# Patient Record
Sex: FEMALE | NOT HISPANIC OR LATINO | Employment: UNEMPLOYED | URBAN - METROPOLITAN AREA
[De-identification: names, ages, dates, MRNs, and addresses within clinical notes are randomized per-mention and may not be internally consistent; named-entity substitution may affect disease eponyms.]

---

## 2018-03-15 ENCOUNTER — TRANSCRIBE ORDERS (OUTPATIENT)
Dept: ADMINISTRATIVE | Facility: HOSPITAL | Age: 58
End: 2018-03-15

## 2018-03-15 DIAGNOSIS — R49.9 VOICE IMPAIRMENT: ICD-10-CM

## 2018-03-15 DIAGNOSIS — R22.1 NECK MASS: Primary | ICD-10-CM

## 2018-03-29 ENCOUNTER — HOSPITAL ENCOUNTER (OUTPATIENT)
Dept: CT IMAGING | Facility: HOSPITAL | Age: 58
Discharge: HOME | End: 2018-03-29
Payer: MEDICARE

## 2018-03-29 DIAGNOSIS — R22.1 NECK MASS: ICD-10-CM

## 2018-03-29 DIAGNOSIS — R49.9 VOICE IMPAIRMENT: ICD-10-CM

## 2018-03-29 PROCEDURE — 70491 CT SOFT TISSUE NECK W/DYE: CPT

## 2018-03-29 RX ADMIN — IOHEXOL 85 ML: 350 INJECTION, SOLUTION INTRAVENOUS at 11:28

## 2019-02-14 ENCOUNTER — TELEPHONE (OUTPATIENT)
Dept: GASTROENTEROLOGY | Facility: CLINIC | Age: 59
End: 2019-02-14

## 2019-02-14 NOTE — TELEPHONE ENCOUNTER
rich pt  Pt is calling for her mri results  She went to [address] [place] [address]  She believes it was in the beginning of January

## 2019-02-14 NOTE — TELEPHONE ENCOUNTER
Ghazala Fay [facility] medical records   only would go to voice mail    message to send all requests by fax to [phone]  Faxed request for MRI results for Pt

## 2019-08-19 ENCOUNTER — APPOINTMENT (EMERGENCY)
Dept: CT IMAGING | Facility: HOSPITAL | Age: 59
DRG: 571 | End: 2019-08-19
Payer: MEDICARE

## 2019-08-19 ENCOUNTER — ANESTHESIA EVENT (OUTPATIENT)
Dept: PERIOP | Facility: HOSPITAL | Age: 59
DRG: 571 | End: 2019-08-19
Payer: MEDICARE

## 2019-08-19 ENCOUNTER — HOSPITAL ENCOUNTER (INPATIENT)
Facility: HOSPITAL | Age: 59
LOS: 2 days | Discharge: HOME HEALTH | DRG: 571 | End: 2019-08-22
Attending: EMERGENCY MEDICINE | Admitting: SURGERY
Payer: MEDICARE

## 2019-08-19 DIAGNOSIS — E66.01 OBESITY, MORBID (MORE THAN 100 LBS OVER IDEAL WEIGHT OR BMI > 40) (HCC): ICD-10-CM

## 2019-08-19 DIAGNOSIS — L02.211 ABDOMINAL WALL ABSCESS: Primary | ICD-10-CM

## 2019-08-19 DIAGNOSIS — J45.909 ASTHMA, UNSPECIFIED ASTHMA SEVERITY, UNSPECIFIED WHETHER COMPLICATED, UNSPECIFIED WHETHER PERSISTENT: ICD-10-CM

## 2019-08-19 PROBLEM — F32.A DEPRESSION: Status: ACTIVE | Noted: 2019-08-19

## 2019-08-19 PROBLEM — R18.8 ABDOMINAL WALL FLUID COLLECTIONS: Status: ACTIVE | Noted: 2019-08-19

## 2019-08-19 PROBLEM — K43.2 INCISIONAL HERNIA, WITHOUT OBSTRUCTION OR GANGRENE: Status: ACTIVE | Noted: 2019-08-19

## 2019-08-19 PROBLEM — K21.9 GERD (GASTROESOPHAGEAL REFLUX DISEASE): Status: ACTIVE | Noted: 2019-08-19

## 2019-08-19 LAB
ALBUMIN SERPL BCP-MCNC: 2.8 G/DL (ref 3.5–5)
ALP SERPL-CCNC: 125 U/L (ref 46–116)
ALT SERPL W P-5'-P-CCNC: 26 U/L (ref 12–78)
ANION GAP SERPL CALCULATED.3IONS-SCNC: 10 MMOL/L (ref 4–13)
AST SERPL W P-5'-P-CCNC: 23 U/L (ref 5–45)
BACTERIA UR QL AUTO: ABNORMAL /HPF
BASOPHILS # BLD AUTO: 0.04 THOUSANDS/ΜL (ref 0–0.1)
BASOPHILS NFR BLD AUTO: 0 % (ref 0–1)
BILIRUB SERPL-MCNC: 0.4 MG/DL (ref 0.2–1)
BILIRUB UR QL STRIP: ABNORMAL
BUN SERPL-MCNC: 11 MG/DL (ref 5–25)
CALCIUM SERPL-MCNC: 9.5 MG/DL (ref 8.3–10.1)
CHLORIDE SERPL-SCNC: 103 MMOL/L (ref 100–108)
CLARITY UR: CLEAR
CO2 SERPL-SCNC: 25 MMOL/L (ref 21–32)
COLOR UR: YELLOW
CREAT SERPL-MCNC: 1.03 MG/DL (ref 0.6–1.3)
EOSINOPHIL # BLD AUTO: 0.09 THOUSAND/ΜL (ref 0–0.61)
EOSINOPHIL NFR BLD AUTO: 1 % (ref 0–6)
ERYTHROCYTE [DISTWIDTH] IN BLOOD BY AUTOMATED COUNT: 11.9 % (ref 11.6–15.1)
GFR SERPL CREATININE-BSD FRML MDRD: 60 ML/MIN/1.73SQ M
GLUCOSE SERPL-MCNC: 95 MG/DL (ref 65–140)
GLUCOSE UR STRIP-MCNC: NEGATIVE MG/DL
HCT VFR BLD AUTO: 42 % (ref 34.8–46.1)
HGB BLD-MCNC: 13.4 G/DL (ref 11.5–15.4)
HGB UR QL STRIP.AUTO: NEGATIVE
IMM GRANULOCYTES # BLD AUTO: 0.19 THOUSAND/UL (ref 0–0.2)
IMM GRANULOCYTES NFR BLD AUTO: 1 % (ref 0–2)
KETONES UR STRIP-MCNC: ABNORMAL MG/DL
LACTATE SERPL-SCNC: 0.7 MMOL/L (ref 0.5–2)
LEUKOCYTE ESTERASE UR QL STRIP: NEGATIVE
LIPASE SERPL-CCNC: 99 U/L (ref 73–393)
LYMPHOCYTES # BLD AUTO: 1.37 THOUSANDS/ΜL (ref 0.6–4.47)
LYMPHOCYTES NFR BLD AUTO: 8 % (ref 14–44)
MCH RBC QN AUTO: 30.9 PG (ref 26.8–34.3)
MCHC RBC AUTO-ENTMCNC: 31.9 G/DL (ref 31.4–37.4)
MCV RBC AUTO: 97 FL (ref 82–98)
MONOCYTES # BLD AUTO: 1.36 THOUSAND/ΜL (ref 0.17–1.22)
MONOCYTES NFR BLD AUTO: 8 % (ref 4–12)
NEUTROPHILS # BLD AUTO: 14.31 THOUSANDS/ΜL (ref 1.85–7.62)
NEUTS SEG NFR BLD AUTO: 82 % (ref 43–75)
NITRITE UR QL STRIP: NEGATIVE
NON-SQ EPI CELLS URNS QL MICRO: ABNORMAL /HPF
NRBC BLD AUTO-RTO: 0 /100 WBCS
PH UR STRIP.AUTO: 6 [PH]
PLATELET # BLD AUTO: 379 THOUSANDS/UL (ref 149–390)
PLATELET # BLD AUTO: 407 THOUSANDS/UL (ref 149–390)
PMV BLD AUTO: 9.3 FL (ref 8.9–12.7)
PMV BLD AUTO: 9.6 FL (ref 8.9–12.7)
POTASSIUM SERPL-SCNC: 4.4 MMOL/L (ref 3.5–5.3)
PROT SERPL-MCNC: 7.5 G/DL (ref 6.4–8.2)
PROT UR STRIP-MCNC: ABNORMAL MG/DL
RBC # BLD AUTO: 4.33 MILLION/UL (ref 3.81–5.12)
RBC #/AREA URNS AUTO: ABNORMAL /HPF
SODIUM SERPL-SCNC: 138 MMOL/L (ref 136–145)
SP GR UR STRIP.AUTO: 1.02 (ref 1–1.03)
UROBILINOGEN UR QL STRIP.AUTO: 0.2 E.U./DL
WBC # BLD AUTO: 17.36 THOUSAND/UL (ref 4.31–10.16)
WBC #/AREA URNS AUTO: ABNORMAL /HPF

## 2019-08-19 PROCEDURE — 87040 BLOOD CULTURE FOR BACTERIA: CPT | Performed by: EMERGENCY MEDICINE

## 2019-08-19 PROCEDURE — 83690 ASSAY OF LIPASE: CPT | Performed by: EMERGENCY MEDICINE

## 2019-08-19 PROCEDURE — 87075 CULTR BACTERIA EXCEPT BLOOD: CPT | Performed by: PHYSICIAN ASSISTANT

## 2019-08-19 PROCEDURE — 99222 1ST HOSP IP/OBS MODERATE 55: CPT | Performed by: INTERNAL MEDICINE

## 2019-08-19 PROCEDURE — 74176 CT ABD & PELVIS W/O CONTRAST: CPT

## 2019-08-19 PROCEDURE — 87077 CULTURE AEROBIC IDENTIFY: CPT | Performed by: PHYSICIAN ASSISTANT

## 2019-08-19 PROCEDURE — 81001 URINALYSIS AUTO W/SCOPE: CPT | Performed by: EMERGENCY MEDICINE

## 2019-08-19 PROCEDURE — 36415 COLL VENOUS BLD VENIPUNCTURE: CPT | Performed by: EMERGENCY MEDICINE

## 2019-08-19 PROCEDURE — 99221 1ST HOSP IP/OBS SF/LOW 40: CPT | Performed by: SURGERY

## 2019-08-19 PROCEDURE — 85025 COMPLETE CBC W/AUTO DIFF WBC: CPT | Performed by: EMERGENCY MEDICINE

## 2019-08-19 PROCEDURE — 99285 EMERGENCY DEPT VISIT HI MDM: CPT | Performed by: EMERGENCY MEDICINE

## 2019-08-19 PROCEDURE — 87185 SC STD ENZYME DETCJ PER NZM: CPT | Performed by: PHYSICIAN ASSISTANT

## 2019-08-19 PROCEDURE — 85049 AUTOMATED PLATELET COUNT: CPT | Performed by: PHYSICIAN ASSISTANT

## 2019-08-19 PROCEDURE — 96360 HYDRATION IV INFUSION INIT: CPT

## 2019-08-19 PROCEDURE — 87081 CULTURE SCREEN ONLY: CPT | Performed by: PHYSICIAN ASSISTANT

## 2019-08-19 PROCEDURE — 87070 CULTURE OTHR SPECIMN AEROBIC: CPT | Performed by: PHYSICIAN ASSISTANT

## 2019-08-19 PROCEDURE — 87181 SC STD AGAR DILUTION PER AGT: CPT | Performed by: PHYSICIAN ASSISTANT

## 2019-08-19 PROCEDURE — 87205 SMEAR GRAM STAIN: CPT | Performed by: PHYSICIAN ASSISTANT

## 2019-08-19 PROCEDURE — 99285 EMERGENCY DEPT VISIT HI MDM: CPT

## 2019-08-19 PROCEDURE — 96374 THER/PROPH/DIAG INJ IV PUSH: CPT

## 2019-08-19 PROCEDURE — 83605 ASSAY OF LACTIC ACID: CPT | Performed by: EMERGENCY MEDICINE

## 2019-08-19 PROCEDURE — 80053 COMPREHEN METABOLIC PANEL: CPT | Performed by: EMERGENCY MEDICINE

## 2019-08-19 RX ORDER — OMEPRAZOLE 40 MG/1
40 CAPSULE, DELAYED RELEASE ORAL DAILY
COMMUNITY

## 2019-08-19 RX ORDER — ZONISAMIDE 100 MG/1
100 CAPSULE ORAL DAILY
Status: DISCONTINUED | OUTPATIENT
Start: 2019-08-20 | End: 2019-08-19

## 2019-08-19 RX ORDER — TRAZODONE HYDROCHLORIDE 100 MG/1
150 TABLET ORAL
COMMUNITY

## 2019-08-19 RX ORDER — HEPARIN SODIUM 5000 [USP'U]/ML
5000 INJECTION, SOLUTION INTRAVENOUS; SUBCUTANEOUS EVERY 8 HOURS SCHEDULED
Status: DISCONTINUED | OUTPATIENT
Start: 2019-08-19 | End: 2019-08-22 | Stop reason: HOSPADM

## 2019-08-19 RX ORDER — BUSPIRONE HYDROCHLORIDE 10 MG/1
30 TABLET ORAL 2 TIMES DAILY
Status: DISCONTINUED | OUTPATIENT
Start: 2019-08-20 | End: 2019-08-22 | Stop reason: HOSPADM

## 2019-08-19 RX ORDER — MELOXICAM 15 MG/1
15 TABLET ORAL DAILY
COMMUNITY
End: 2021-07-07 | Stop reason: ALTCHOICE

## 2019-08-19 RX ORDER — ARIPIPRAZOLE 5 MG/1
15 TABLET ORAL DAILY
Status: DISCONTINUED | OUTPATIENT
Start: 2019-08-20 | End: 2019-08-22 | Stop reason: HOSPADM

## 2019-08-19 RX ORDER — MONTELUKAST SODIUM 10 MG/1
10 TABLET ORAL
COMMUNITY

## 2019-08-19 RX ORDER — METOCLOPRAMIDE 5 MG/1
5 TABLET ORAL
COMMUNITY

## 2019-08-19 RX ORDER — ZONISAMIDE 100 MG/1
100 CAPSULE ORAL DAILY
COMMUNITY

## 2019-08-19 RX ORDER — ONDANSETRON 2 MG/ML
4 INJECTION INTRAMUSCULAR; INTRAVENOUS EVERY 6 HOURS PRN
Status: DISCONTINUED | OUTPATIENT
Start: 2019-08-19 | End: 2019-08-22 | Stop reason: HOSPADM

## 2019-08-19 RX ORDER — PROPRANOLOL HYDROCHLORIDE 80 MG/1
CAPSULE, EXTENDED RELEASE ORAL
COMMUNITY
End: 2021-07-07 | Stop reason: ALTCHOICE

## 2019-08-19 RX ORDER — ARIPIPRAZOLE 15 MG/1
15 TABLET ORAL DAILY
COMMUNITY

## 2019-08-19 RX ORDER — BUSPIRONE HYDROCHLORIDE 10 MG/1
15 TABLET ORAL 3 TIMES DAILY
COMMUNITY

## 2019-08-19 RX ORDER — ALBUTEROL SULFATE 90 UG/1
2 AEROSOL, METERED RESPIRATORY (INHALATION) EVERY 6 HOURS PRN
COMMUNITY

## 2019-08-19 RX ORDER — DOCUSATE SODIUM 100 MG/1
100 CAPSULE, LIQUID FILLED ORAL 2 TIMES DAILY
Status: DISCONTINUED | OUTPATIENT
Start: 2019-08-19 | End: 2019-08-22 | Stop reason: HOSPADM

## 2019-08-19 RX ORDER — BUSPIRONE HYDROCHLORIDE 5 MG/1
15 TABLET ORAL 2 TIMES DAILY
Status: DISCONTINUED | OUTPATIENT
Start: 2019-08-19 | End: 2019-08-19

## 2019-08-19 RX ORDER — MONTELUKAST SODIUM 10 MG/1
10 TABLET ORAL
Status: DISCONTINUED | OUTPATIENT
Start: 2019-08-19 | End: 2019-08-22 | Stop reason: HOSPADM

## 2019-08-19 RX ORDER — PROPRANOLOL HCL 60 MG
60 CAPSULE, EXTENDED RELEASE 24HR ORAL DAILY
Status: DISCONTINUED | OUTPATIENT
Start: 2019-08-20 | End: 2019-08-22 | Stop reason: HOSPADM

## 2019-08-19 RX ORDER — TRAZODONE HYDROCHLORIDE 50 MG/1
50 TABLET ORAL
Status: DISCONTINUED | OUTPATIENT
Start: 2019-08-19 | End: 2019-08-22 | Stop reason: HOSPADM

## 2019-08-19 RX ORDER — ACETAMINOPHEN 325 MG/1
650 TABLET ORAL EVERY 6 HOURS PRN
Status: DISCONTINUED | OUTPATIENT
Start: 2019-08-19 | End: 2019-08-21

## 2019-08-19 RX ORDER — FAMOTIDINE 20 MG/1
20 TABLET, FILM COATED ORAL 2 TIMES DAILY
Status: DISCONTINUED | OUTPATIENT
Start: 2019-08-19 | End: 2019-08-22 | Stop reason: HOSPADM

## 2019-08-19 RX ORDER — SODIUM CHLORIDE, SODIUM LACTATE, POTASSIUM CHLORIDE, CALCIUM CHLORIDE 600; 310; 30; 20 MG/100ML; MG/100ML; MG/100ML; MG/100ML
100 INJECTION, SOLUTION INTRAVENOUS CONTINUOUS
Status: DISCONTINUED | OUTPATIENT
Start: 2019-08-20 | End: 2019-08-21

## 2019-08-19 RX ORDER — LEVALBUTEROL 1.25 MG/.5ML
1.25 SOLUTION, CONCENTRATE RESPIRATORY (INHALATION) EVERY 4 HOURS PRN
Status: DISCONTINUED | OUTPATIENT
Start: 2019-08-19 | End: 2019-08-21

## 2019-08-19 RX ORDER — HYDROCODONE BITARTRATE AND ACETAMINOPHEN 5; 325 MG/1; MG/1
1 TABLET ORAL EVERY 6 HOURS PRN
COMMUNITY
End: 2021-07-07 | Stop reason: ALTCHOICE

## 2019-08-19 RX ORDER — FLUTICASONE FUROATE AND VILANTEROL 200; 25 UG/1; UG/1
1 POWDER RESPIRATORY (INHALATION) DAILY
Status: DISCONTINUED | OUTPATIENT
Start: 2019-08-20 | End: 2019-08-22 | Stop reason: HOSPADM

## 2019-08-19 RX ORDER — BUPROPION HYDROCHLORIDE 300 MG/1
300 TABLET ORAL EVERY MORNING
COMMUNITY

## 2019-08-19 RX ORDER — BUPROPION HYDROCHLORIDE 150 MG/1
300 TABLET ORAL DAILY
Status: DISCONTINUED | OUTPATIENT
Start: 2019-08-20 | End: 2019-08-22 | Stop reason: HOSPADM

## 2019-08-19 RX ADMIN — MONTELUKAST 10 MG: 10 TABLET, FILM COATED ORAL at 22:52

## 2019-08-19 RX ADMIN — SODIUM CHLORIDE 1000 ML: 0.9 INJECTION, SOLUTION INTRAVENOUS at 12:23

## 2019-08-19 RX ADMIN — HEPARIN SODIUM 5000 UNITS: 5000 INJECTION INTRAVENOUS; SUBCUTANEOUS at 19:10

## 2019-08-19 RX ADMIN — BUSPIRONE HYDROCHLORIDE 15 MG: 5 TABLET ORAL at 19:12

## 2019-08-19 RX ADMIN — DOCUSATE SODIUM 100 MG: 100 CAPSULE, LIQUID FILLED ORAL at 19:09

## 2019-08-19 RX ADMIN — FAMOTIDINE 20 MG: 20 TABLET ORAL at 19:09

## 2019-08-19 RX ADMIN — TRAZODONE HYDROCHLORIDE 50 MG: 50 TABLET ORAL at 22:52

## 2019-08-19 RX ADMIN — PIPERACILLIN SODIUM AND TAZOBACTAM SODIUM 3.38 G: 36; 4.5 INJECTION, POWDER, FOR SOLUTION INTRAVENOUS at 22:53

## 2019-08-19 RX ADMIN — MORPHINE SULFATE 2 MG: 2 INJECTION, SOLUTION INTRAMUSCULAR; INTRAVENOUS at 19:11

## 2019-08-19 RX ADMIN — PIPERACILLIN SODIUM AND TAZOBACTAM SODIUM 3.38 G: 36; 4.5 INJECTION, POWDER, FOR SOLUTION INTRAVENOUS at 16:44

## 2019-08-19 NOTE — CONSULTS
Inpatient Medical Consultation - [facility] Internal Medicine    Patient Information: Meliza Smyth 63 y o  female MRN: [id number]  Unit/Bed#: -01 Encounter: [id number]  PCP: Tariq Gar MD  Date of Admission:  8/19/2019  Date of Consultation: 08/19/19  Requesting Physician: Devin Maurice MD    Reason For Consultation:   Medical management    Assessment/Plan:    Hospital Problem List:     Principal Problem:    Abdominal wall abscess  Active Problems: Morbid obesity (HCC)    Asthma    GERD (gastroesophageal reflux disease)    Incisional hernia, without obstruction or gangrene    Abdominal wall fluid collections    Depression      Plan:   Abdominal wall abscess  Assessment & Plan  Patient with abdominal wall and history of hernia surgery in the past removal of mesh/part of her intestine  She has quite a bit of drainage from her abdominal wall  Treatment as per primary team   She is also on antibiotics as per primary team     Depression  Assessment & Plan  Continue with her home medications    Abdominal wall fluid collections  Assessment & Plan  Treatment as per primary team    Incisional hernia, without obstruction or gangrene  Assessment & Plan  Treatment as per surgery  Plan as above    GERD (gastroesophageal reflux disease)  Assessment & Plan  Continue with home medications    Asthma  Assessment & Plan  Does not appear to be in any exacerbation  Would give p r n  Nebulizer treatments if needed    Morbid obesity ([address] )  Assessment & Plan  She needs to work on her diet and exercise      VTE Prophylaxis: Heparin  / sequential compression device     Recommendations for Discharge:  · Resume home medications on discharge    Counseling / Coordination of Care Time: 45+ minutes  Greater than 50% of total time spent on patient counseling and coordination of care  Collaboration of Care:  Were Recommendations Directly Discussed with Primary Treatment Team? - Yes     History of Present Illness:    Argelia Kamara Ruth Law is a 63 y o  female who is originally admitted to the surgical service on 8/19/2019 due to abdominal wall abscess  We are consulted for medical management  Per patient, she started having some pain in her abdominal wall few days ago  Then there was a blister which burst and started draining  She has history of abdominal hernia and has had previous mesh removed and at that time she also had part of her intestine removed  Denies any fever or chills  No nausea or vomiting  Has abdominal pain  Review of Systems          All systems are reviewed  Positive as per history of presenting illness  Patient answers no to all questions           Past Medical and Surgical History:     Past Medical History:   Diagnosis Date    Abdominal wall fluid collections 8/19/2019    Asthma 8/19/2019    Depression     GERD (gastroesophageal reflux disease) 8/19/2019    Incisional hernia, without obstruction or gangrene 8/19/2019       Past Surgical History:   Procedure Laterality Date    CHOLECYSTECTOMY      SMALL INTESTINE SURGERY      UMBILICAL HERNIA REPAIR         Meds/Allergies:  Medication Sig Dispensed Refills Start Date End Date Status   busPIRone (BUSPAR) 15 MG tablet   take 2 tablets by mouth twice a day   0 10/17/2017   Active   ADVAIR DISKUS 500-50 mcg/dose DISKUS   inhale 1 dose by mouth twice a day   1 11/24/2017   Active   HYDROcodone-acetaminophen (NORCO )  mg per tablet   take 1 tablet by mouth four times a day if needed   0 09/27/2017   Active   meloxicam (MOBIC) 15 MG tablet   take 1 tablet by mouth once daily after meals   0 12/08/2017   Active   montelukast (SINGULAIR) 10 mg tablet       0 11/25/2017   Active   omeprazole (PriLOSEC) 40 MG capsule       1 12/05/2017   Active   propranolol (INDERAL LA) 60 MG 24 hr capsule       0 12/08/2017   Active   zonisamide (ZONEGRAN) 100 MG capsule       0 12/07/2017   Active   diclofenac sodium 1 % gel   diclofenac 1 % topical gel   0     Active   albuterol (VENTOLIN HFA) 90 mcg/actuation inhaler   Ventolin HFA 90 mcg/actuation aerosol inhaler   0 08/05/2009   Active   dicyclomine (BENTYL) 20 mg tablet   dicyclomine 20 mg tablet   0     Active   hydrOXYzine (ATARAX) 50 MG tablet   hydroxyzine HCl 50 mg tablet   0     Active   methocarbamol (ROBAXIN) 750 MG tablet   Take by mouth    0 08/05/2009   Active   SUMAtriptan (IMITREX) 50 MG tablet   sumatriptan 50 mg tablet   0     Active   INCRUSE ELLIPTA 62 5 mcg/actuation powder for inhalation   1 INHALATION ONCE A DAY   11 06/27/2019   Active   ARIPiprazole (ABILIFY) 15 MG tablet   aripiprazole 15 mg tablet   0     Active   ARIPiprazole (ABILIFY) 15 MG tablet   TAKE 1 TABLET BY MOUTH EVERY DAY IN THE MORNING   2 07/11/2019   Active   buPROPion (WELLBUTRIN XL) 300 MG 24 hr tablet   bupropion HCl  mg 24 hr tablet, extended release   0     Active   busPIRone (BUSPAR) 10 MG tablet   buspirone 10 mg tablet   0     Active   busPIRone (BUSPAR) 10 MG tablet   TAKE 3 TABLETS BY MOUTH TWICE A DAY   2 07/11/2019   Active   traZODone (DESYREL) 100 MG tablet   Take by mouth  all medications and allergies reviewed    Allergies: Allergies   Allergen Reactions    Naproxen Myalgia, Other (See Comments) and Abdominal Pain     Pains of stomach flusnessof body, shortness of breath  Hot, cramps:Gi upset, Itchy and a burning sensation   Pains of stomach flusnessof body, shortness of breath      Risperidone Abdominal Pain, Other (See Comments) and GI Intolerance     Shortness of breath,nightmares,numbness of hands, blurred vision  Unsteady gait  Sharp pains in abdomen, Hot, itchy   Shortness of breath,nightmares,numbness of hands, blurred vision   Unsteady gait         Social History:     Marital Status: Unknown    Substance Use History:   Social History     Substance and Sexual Activity   Alcohol Use Never    Frequency: Never     Social History     Tobacco Use   Smoking Status Never Smoker Smokeless Tobacco Never Used     Social History     Substance and Sexual Activity   Drug Use Never       Family History:    Family history is reviewed and is not pertinent to her current illness            Physical Exam      Vitals:   Blood Pressure: 133/71 (08/19/19 1743)  Pulse: 71 (08/19/19 1743)  Temperature: 99 2 °F (37 3 °C) (08/19/19 1743)  Temp Source: Oral (08/19/19 1743)  Respirations: 19 (08/19/19 1743)  Height: 5' (152 4 cm) (08/19/19 1743)  Weight - Scale: 111 kg (244 lb 12 8 oz) (08/19/19 1759)  SpO2: 97 % (08/19/19 1743)      Vital signs are reviewed as above  Constitutional:  Morbidly obese female who is sitting at the edge of the bed  She basically has abdominal wall wound/abscess which has dressing on it and leaking and draining on the floor  Eyes: EOM grossly intact  Conjunctivae slightly pale  Patient has anicteric sclera  HENT: Oropharynx are crowded and moist  Did not notice any significant lesions on the tongue  Head normocephalic  Neck: Neck is obese and supple  I was not able to visualize any JVD at 90°  There is no significant lymphadenopathy  I also did not notice any significant thyromegaly  Cardiac: I did not hear any rubs or gallop  Patient appears to be in sinus rhythm  Respiratory: Patient not in significant respiratory distress  Air entry in general is poor because of body habitus  GI:  As above  Other examine is as per primary team regarding her wound  Neurologic:  Patient is awake and alert  Neurological examination is grossly intact  No obvious focal neurological deficit noticed  Skin:  As above  Psychiatric: Mood and affect are pleasant at this moment  No significant cyanosis or clubbing          Additional Data:     Lab Results:  I Have Reviewed All Lab Data Below:    Results from last 7 days   Lab Units 08/19/19  1215   WBC Thousand/uL 17 36*   HEMOGLOBIN g/dL 13 4   HEMATOCRIT % 42 0   PLATELETS Thousands/uL 407*   NEUTROS PCT % 82*   LYMPHS PCT % 8*   MONOS PCT % 8 EOS PCT % 1     Results from last 7 days   Lab Units 08/19/19  1215   POTASSIUM mmol/L 4 4   CHLORIDE mmol/L 103   CO2 mmol/L 25   BUN mg/dL 11   CREATININE mg/dL 1 03   CALCIUM mg/dL 9 5   ALK PHOS U/L 125*   ALT U/L 26   AST U/L 23           * Additional Pertinent Lab Tests Reviewed: All Labs Within Last 24 Hours Reviewed    Imaging: I have personally reviewed pertinent reports  Ct Abdomen Pelvis Wo Contrast    Result Date: 8/19/2019  Narrative: CT ABDOMEN AND PELVIS WITHOUT IV CONTRAST INDICATION:   Abdominal pain, unspecified abd pain and redness and blister by umbilicus  COMPARISON:  None  TECHNIQUE:  CT examination of the abdomen and pelvis was performed without intravenous contrast   Axial, sagittal, and coronal 2D reformatted images were created from the source data and submitted for interpretation  Radiation dose length product (DLP) for this visit:  [phone] mGy-cm   This examination, like all CT scans performed in the [facility], was performed utilizing techniques to minimize radiation dose exposure, including the use of iterative reconstruction and automated exposure control  Enteric contrast was not administered  Attempted intravenous contrast injection infiltrated approximately 20 cc of IV contrast, subsequent remainder of the exam was performed without contrast  FINDINGS: ABDOMEN LOWER CHEST:  No clinically significant abnormality identified in the visualized lower chest   Minimal pericardial fluid  32 LIVER/BILIARY TREE:  Unremarkable  GALLBLADDER:  Gallbladder is surgically absent  SPLEEN:  Unremarkable  PANCREAS:  Unremarkable  ADRENAL GLANDS:  Unremarkable  KIDNEYS/URETERS:  Parapelvic cysts noted, right greater than left  No acute abnormality evident  No hydronephrosis  STOMACH AND BOWEL:  Unremarkable  APPENDIX:  No findings to suggest appendicitis  ABDOMINOPELVIC CAVITY:  No ascites or free intraperitoneal air  No lymphadenopathy   VESSELS:  Uterus appears lobulated and bulky, limited evaluation on unenhanced exam, overall appearance suggest underlying leiomyomata which would be better evaluated with ultrasound  No adnexal mass evident  PELVIS REPRODUCTIVE ORGANS:  Unremarkable for patient's age  URINARY BLADDER:  Only minimally distended, excreted contrast present  No gross abnormality  ABDOMINAL WALL/INGUINAL REGIONS:  Just inferior to the umbilicus there is an midline ventral abdominal hernia defect measuring 4 0 x 2 4 cm, containing omental fat  Cephalad to this in just cephalad to the umbilicus is a large midline ventral soft tissue structure which is mixed in attenuation, cannot accurately differentiate solid versus cystic on this study, surrounding haziness within the fat suggesting component of inflammation  It measures 7 4 x 8 6 cm in size  Correlate with clinical findings for evolving abscess versus solid soft tissue finding such as hematoma or phlegmon this does not extend deep to the abdominal wall fascia  OSSEOUS STRUCTURES:  No acute fracture or destructive osseous lesion  Impression: Irregular lobulated soft tissue lesion with surrounding hazy infiltrate, seen midline supraumbilical   See above for further details  Infraumbilical fat-containing hernia  Lobulated bulky appearing uterus presumably related to leiomyomata  Workstation performed: [id number]         ** Please Note: Dragon 360 Dictation speech to text software may have been used in the creation of this document **      Thank you for letting us participating in this patient's care, we would follow along with you    Please do not hesitate to contact us with any concerns or questions to have

## 2019-08-19 NOTE — ED NOTES
1 CC                                                                Abdominal pain  2  Orientation status                                         Alert and oriented  3  Abnormal labs, tests, vitals                         WBC 17 6  HR 68, T 98, /96, R 20, 97% room air  4  Medication drips                                            Zosyn 3 375g  5  Time of last narcotics                                   None  6  IV lines, drains, tubes                                  #20 left ac, #22 left hand  7  Isolation status                                            none  8  Skin                                                             Abdominal redness and swelling with purulent drainage at umbilicus  9  Ambulation status                                        Ambulates with assistance  10   ED phone number                                    #[phone]        Jaden Castorena RN  08/19/19 2084

## 2019-08-19 NOTE — ED PROVIDER NOTES
History  Chief Complaint   Patient presents with    Abdominal Pain     pt c/o mid abdominal pain that started thursday  pt c/o diarrhea, denies N/V  History provided by:  Patient  Abdominal Pain   Pain location:  Periumbilical  Pain quality: aching and bloating    Pain radiates to:  Does not radiate  Pain severity:  Moderate  Onset quality:  Gradual  Duration:  5 days  Timing:  Constant  Progression:  Worsening  Chronicity:  New  Context: previous surgery (had numerous abd hernia repairs, last was more than 5 years ago though)    Relieved by:  Nothing  Worsened by:  Nothing  Ineffective treatments: took a laxative b/c she felt she "had a blockage" and then started with diarrhea and sx did not improve  Associated symptoms: chills, diarrhea (but only after laxative use) and fatigue    Associated symptoms: no dysuria, no fever, no hematuria, no nausea, no shortness of breath and no vomiting    Risk factors: multiple surgeries        None       Past Medical History:   Diagnosis Date    Depression        Past Surgical History:   Procedure Laterality Date    CHOLECYSTECTOMY      SMALL INTESTINE SURGERY      UMBILICAL HERNIA REPAIR         History reviewed  No pertinent family history  I have reviewed and agree with the history as documented  Social History     Tobacco Use    Smoking status: Never Smoker    Smokeless tobacco: Never Used   Substance Use Topics    Alcohol use: Never     Frequency: Never    Drug use: Never        Review of Systems   Constitutional: Positive for chills and fatigue  Negative for fever  Respiratory: Negative for shortness of breath  Gastrointestinal: Positive for abdominal pain and diarrhea (but only after laxative use)  Negative for nausea and vomiting  Genitourinary: Negative for dysuria and hematuria  All other systems reviewed and are negative  Physical Exam  Physical Exam   Constitutional: She is oriented to person, place, and time   She appears well-developed and well-nourished  No distress  HENT:   Head: Normocephalic and atraumatic  Nose: Nose normal    Mouth/Throat: Oropharynx is clear and moist    Eyes: Conjunctivae and EOM are normal    Neck: Normal range of motion  Neck supple  Cardiovascular: Normal rate, regular rhythm and normal heart sounds  Pulmonary/Chest: Effort normal and breath sounds normal  No respiratory distress  Abdominal: Soft  She exhibits no distension  There is tenderness  No hernia  Redness/induration and firmness of skin around umbilicus with superficial blister cephalad to the umbilicus   Musculoskeletal: Normal range of motion  She exhibits no edema  Neurological: She is alert and oriented to person, place, and time  Skin: Skin is warm and dry  She is not diaphoretic  Psychiatric: She has a normal mood and affect  Nursing note and vitals reviewed        Vital Signs  ED Triage Vitals   Temperature Pulse Respirations Blood Pressure SpO2   08/19/19 1202 08/19/19 1202 08/19/19 1202 08/19/19 1202 08/19/19 1202   98 °F (36 7 °C) 67 18 123/57 98 %      Temp Source Heart Rate Source Patient Position - Orthostatic VS BP Location FiO2 (%)   08/19/19 1147 08/19/19 1147 08/19/19 1147 08/19/19 1147 --   Oral Monitor Sitting Left arm       Pain Score       08/19/19 1202       Worst Possible Pain           Vitals:    08/19/19 1530 08/19/19 1545 08/19/19 1600 08/19/19 1615   BP: (!) 183/96      Pulse: 65 69 73 68   Patient Position - Orthostatic VS:             Visual Acuity      ED Medications  Medications   piperacillin-tazobactam (ZOSYN) 3 375 g in sodium chloride 0 9 % 50 mL IVPB (3 375 g Intravenous New Bag 8/19/19 1644)   sodium chloride 0 9 % bolus 1,000 mL (0 mL Intravenous Stopped 8/19/19 1338)       Diagnostic Studies  Results Reviewed     Procedure Component Value Units Date/Time    Lactic acid, plasma [[id number]]  (Normal) Collected:  08/19/19 1229    Lab Status:  Final result Specimen:  Blood from Arm, Right Updated:  08/19/19 1305     LACTIC ACID 0 7 mmol/L     Narrative:       Result may be elevated if tourniquet was used during collection  Urine Microscopic [[id number]]  (Abnormal) Collected:  08/19/19 1215    Lab Status:  Final result Specimen:  Urine, Clean Catch Updated:  08/19/19 1251     RBC, UA 0-1 /hpf      WBC, UA 0-1 /hpf      Epithelial Cells Occasional /hpf      Bacteria, UA Occasional /hpf     Comprehensive metabolic panel [[id number]]  (Abnormal) Collected:  08/19/19 1215    Lab Status:  Final result Specimen:  Blood from Arm, Right Updated:  08/19/19 1246     Sodium 138 mmol/L      Potassium 4 4 mmol/L      Chloride 103 mmol/L      CO2 25 mmol/L      ANION GAP 10 mmol/L      BUN 11 mg/dL      Creatinine 1 03 mg/dL      Glucose 95 mg/dL      Calcium 9 5 mg/dL      AST 23 U/L      ALT 26 U/L      Alkaline Phosphatase 125 U/L      Total Protein 7 5 g/dL      Albumin 2 8 g/dL      Total Bilirubin 0 40 mg/dL      eGFR 60 ml/min/1 73sq m     Narrative:       National Kidney Disease Foundation guidelines for Chronic Kidney Disease (CKD):     Stage 1 with normal or high GFR (GFR > 90 mL/min/1 73 square meters)    Stage 2 Mild CKD (GFR = 60-89 mL/min/1 73 square meters)    Stage 3A Moderate CKD (GFR = 45-59 mL/min/1 73 square meters)    Stage 3B Moderate CKD (GFR = 30-44 mL/min/1 73 square meters)    Stage 4 Severe CKD (GFR = 15-29 mL/min/1 73 square meters)    Stage 5 End Stage CKD (GFR <15 mL/min/1 73 square meters)  Note: GFR calculation is accurate only with a steady state creatinine    Lipase [[id number]]  (Normal) Collected:  08/19/19 1215    Lab Status:  Final result Specimen:  Blood from Arm, Right Updated:  08/19/19 1246     Lipase 99 u/L     Blood culture #1 [[id number]] Collected:  08/19/19 1229    Lab Status: In process Specimen:  Blood from Arm, Right Updated:  08/19/19 1238    Blood culture #2 [[id number]] Collected:  08/19/19 1229    Lab Status:   In process Specimen:  Blood from Arm, Left Updated:  08/19/19 1238    CBC and differential [[id number]]  (Abnormal) Collected:  08/19/19 1215    Lab Status:  Final result Specimen:  Blood from Arm, Right Updated:  08/19/19 1235     WBC 17 36 Thousand/uL      RBC 4 33 Million/uL      Hemoglobin 13 4 g/dL      Hematocrit 42 0 %      MCV 97 fL      MCH 30 9 pg      MCHC 31 9 g/dL      RDW 11 9 %      MPV 9 6 fL      Platelets 480 Thousands/uL      nRBC 0 /100 WBCs      Neutrophils Relative 82 %      Immat GRANS % 1 %      Lymphocytes Relative 8 %      Monocytes Relative 8 %      Eosinophils Relative 1 %      Basophils Relative 0 %      Neutrophils Absolute 14 31 Thousands/µL      Immature Grans Absolute 0 19 Thousand/uL      Lymphocytes Absolute 1 37 Thousands/µL      Monocytes Absolute 1 36 Thousand/µL      Eosinophils Absolute 0 09 Thousand/µL      Basophils Absolute 0 04 Thousands/µL     UA w Reflex to Microscopic [[id number]]  (Abnormal) Collected:  08/19/19 1215    Lab Status:  Final result Specimen:  Urine, Clean Catch Updated:  08/19/19 1230     Color, UA Yellow     Clarity, UA Clear     Specific Gravity, UA 1 025     pH, UA 6 0     Leukocytes, UA Negative     Nitrite, UA Negative     Protein, UA Trace mg/dl      Glucose, UA Negative mg/dl      Ketones, UA 15 (1+) mg/dl      Urobilinogen, UA 0 2 E U /dl      Bilirubin, UA Small     Blood, UA Negative                 CT abdomen pelvis wo contrast   Final Result by Lydia Elkins MD (08/19 2686)      Irregular lobulated soft tissue lesion with surrounding hazy infiltrate, seen midline supraumbilical   See above for further details  Infraumbilical fat-containing hernia  Lobulated bulky appearing uterus presumably related to leiomyomata  Workstation performed: [id number]                    Procedures  Procedures       ED Course  ED Course as of Aug 19 1648   Mon Aug 19, 2019   1541 Pt being seen by Surgery                                    MDM  Number of Diagnoses or Management Options  Abdominal wall abscess: new and requires workup     Amount and/or Complexity of Data Reviewed  Clinical lab tests: ordered and reviewed  Tests in the radiology section of CPT®: ordered and reviewed  Discuss the patient with other providers: yes        Disposition  Final diagnoses:   Abdominal wall abscess     Time reflects when diagnosis was documented in both MDM as applicable and the Disposition within this note     Time User Action Codes Description Comment    8/19/2019  4:44 PM Qian [address], [address] [facility] Abdominal wall abscess       ED Disposition     ED Disposition Condition Date/Time Comment    Admit Stable Mon Aug 19, 2019  4:44 PM Case was discussed with Jamel Barfield and the patient's admission status was agreed to be Admission Status: observation status to the service of Dr Jamel Barfield   Follow-up Information    None         Patient's Medications    No medications on file     No discharge procedures on file      ED Provider  Electronically Signed by           Yesy Landeros MD  08/19/19 7239

## 2019-08-19 NOTE — ASSESSMENT & PLAN NOTE
Patient with abdominal wall and history of hernia surgery in the past removal of mesh/part of her intestine  She has quite a bit of drainage from her abdominal wall    Treatment as per primary team

## 2019-08-19 NOTE — H&P
H&P Exam - General Surgery   Finn Fajardo 63 y o  female MRN: [id number]  Unit/Bed#: -01 Encounter: [id number]    Assessment/Plan   Ventral Incisional Hernia  Abdominal Wall fluid collection  Leukocytosis  Obesity    52-year-old he female with history of previous umbilical repair with mesh and revision with mesh removal presents with periumbilical abdominal pain with bullae caudal to her umbilicus  Area of erythema, tenderness, and induration consistent with cellulitis with diameter of approximately 15 cm  Bullae contain clear serous fluid with particulate but ruptured shortly after initial examination and produced a purulent foul-smelling discharge  Leukocytosis of 17  Patient is afebrile  CT scan showed irregular lobulated soft tissue fluid collection supraumbilical in the region consistent with patient's previous surgical scar  This measured 7 4 x 0 6 cm  Patient also with an from a umbilical hernia and a measuring 4 x 2 4 cm  Plan:  -- Admit under general surgical service and plan for I&D and wash out of abdominal wall abscess in the OR tomorrow  Cultures to be obtained at that time  -- IV antibiotics  -- NPO at midnight, and IVF  -- Warm compresses  -- Analgesia prn  -- Slim consult for medical management    History of Present Illness    CC:  I thought I had an obstruction  I took a laxative and then got diarrhea  Then it started to hurt around my belly button a few days ago  Yesterday my daughter looked and siad it was all red and it looked like I had a blister  HPI:  Finn Fajardo is a 63 y o  female with PMH of morbid obesity, asthma, and GERD, who presented with anterior periumbilical pain  Patient states about 2 weeks ago she thought she had an "obstruction", she decided take a laxative in about a week ago started to have diarrhea  A couple days ago she started to have periumbilical pain and burning    She states she had her daughter look at her abdomen and she was told that there is redness around her umbilical area with what seemed like a developing blister  She has experienced more pain prompting her to present to the emergency department  She states she has had less of an appetite over the last couple weeks  She states she is still having loose stools a couple times a day  She denies any hematochezia or melena  She denies any nausea or vomiting  She does feel more fatigued than usual   Patient states she had a laparoscopic gallbladder surgery a and later developed and umbilical hernia  This was repaired with mesh  In 2013 she had "bowel issues" and recurrence of the hernia  At that time she had small-bowel resection with removal of the mesh  Wound was left open to heal by secondary intention  She did have wound VAC therapy during this time  Patient states she has not had issue with the wound since then  She states she has had chills but denies any fevers  She has had 3 abdominal surgeries, which of those mentioned above:  Cholecystectomy, umbilical hernia repair, and umbilical hernia revision with small bowel resection  She believes her 1st hernia repair was at [facility]- when it was [place]  She states the 2nd surgery was in [facility] pass  Review of Systems   Constitutional: Positive for appetite change and chills  Negative for fever  HENT: Negative  Eyes: Negative  Respiratory: Negative  Negative for cough, shortness of breath and wheezing  Cardiovascular: Negative  Negative for chest pain and palpitations  Gastrointestinal: Positive for abdominal pain and diarrhea  Negative for blood in stool, nausea and vomiting  Skin: Positive for color change  Neurological: Negative          Historical Information   Past Medical History:   Diagnosis Date    Abdominal wall fluid collections 8/19/2019    Asthma 8/19/2019    Depression     GERD (gastroesophageal reflux disease) 8/19/2019    Incisional hernia, without obstruction or gangrene 8/19/2019     Past Surgical History:   Procedure Laterality Date    CHOLECYSTECTOMY      SMALL INTESTINE SURGERY      UMBILICAL HERNIA REPAIR       Social History   Social History     Substance and Sexual Activity   Alcohol Use Never    Frequency: Never     Social History     Substance and Sexual Activity   Drug Use Never     Social History     Tobacco Use   Smoking Status Never Smoker   Smokeless Tobacco Never Used     Family History: non-contributory    Meds/Allergies   PTA meds:   None     Allergies   Allergen Reactions    Naproxen Myalgia, Other (See Comments) and Abdominal Pain     Pains of stomach flusnessof body, shortness of breath  Hot, cramps:Gi upset, Itchy and a burning sensation   Pains of stomach flusnessof body, shortness of breath      Risperidone Abdominal Pain, Other (See Comments) and GI Intolerance     Shortness of breath,nightmares,numbness of hands, blurred vision  Unsteady gait  Sharp pains in abdomen, Hot, itchy   Shortness of breath,nightmares,numbness of hands, blurred vision   Unsteady gait         Objective   First Vitals:   Blood Pressure: 123/57 (08/19/19 1202)  Pulse: 67 (08/19/19 1202)  Temperature: 98 °F (36 7 °C) (08/19/19 1202)  Temp Source: Oral (08/19/19 1147)  Respirations: 18 (08/19/19 1202)  Height: 5' (152 4 cm) (08/19/19 1743)  Weight - Scale: 113 kg (248 lb 7 3 oz) (08/19/19 1213)  SpO2: 98 % (08/19/19 1202)    Current Vitals:   Blood Pressure: 133/71 (08/19/19 1743)  Pulse: 71 (08/19/19 1743)  Temperature: 99 2 °F (37 3 °C) (08/19/19 1743)  Temp Source: Oral (08/19/19 1743)  Respirations: 19 (08/19/19 1743)  Height: 5' (152 4 cm) (08/19/19 1743)  Weight - Scale: 111 kg (244 lb 12 8 oz) (08/19/19 1759)  SpO2: 97 % (08/19/19 1743)    No intake or output data in the 24 hours ending 08/19/19 1822    Invasive Devices     Peripheral Intravenous Line            Peripheral IV 08/19/19 Hand less than 1 day    Peripheral IV 08/19/19 Left Antecubital less than 1 day                Physical Exam Constitutional: She appears well-developed and well-nourished  No distress  Cardiovascular: Regular rhythm, S1 normal and S2 normal    Murmur heard  Systolic murmur is present with a grade of 2/6  Pulmonary/Chest: Effort normal and breath sounds normal  No accessory muscle usage  No respiratory distress  She has no decreased breath sounds  She has no wheezes  She has no rhonchi  Abdominal: Bowel sounds are normal  There is tenderness  A hernia (infraumbilical) is present  Neurological: She is alert  Lab Results:   CBC:   Lab Results   Component Value Date    WBC 17 36 (H) 08/19/2019    HGB 13 4 08/19/2019    HCT 42 0 08/19/2019    MCV 97 08/19/2019     (H) 08/19/2019    MCH 30 9 08/19/2019    MCHC 31 9 08/19/2019    RDW 11 9 08/19/2019    MPV 9 6 08/19/2019    NRBC 0 08/19/2019   , CMP:   Lab Results   Component Value Date    SODIUM 138 08/19/2019    K 4 4 08/19/2019     08/19/2019    CO2 25 08/19/2019    BUN 11 08/19/2019    CREATININE 1 03 08/19/2019    CALCIUM 9 5 08/19/2019    AST 23 08/19/2019    ALT 26 08/19/2019    ALKPHOS 125 (H) 08/19/2019    EGFR 60 08/19/2019   , Coagulation: No results found for: PT, INR, APTT, Urinalysis:   Lab Results   Component Value Date    COLORU Yellow 08/19/2019    CLARITYU Clear 08/19/2019    SPECGRAV 1 025 08/19/2019    PHUR 6 0 08/19/2019    LEUKOCYTESUR Negative 08/19/2019    NITRITE Negative 08/19/2019    GLUCOSEU Negative 08/19/2019    KETONESU 15 (1+) (A) 08/19/2019    BILIRUBINUR Small (A) 08/19/2019    BLOODU Negative 08/19/2019   , Lipase:   Lab Results   Component Value Date    LIPASE 99 08/19/2019     Imaging: I have personally reviewed pertinent reports  Ct Abdomen Pelvis Wo Contrast    Result Date: 8/19/2019  Impression: Irregular lobulated soft tissue lesion with surrounding hazy infiltrate, seen midline supraumbilical   See above for further details  Infraumbilical fat-containing hernia   Lobulated bulky appearing uterus presumably related to leiomyomata  Workstation performed: [id number]     EKG, Pathology, and Other Studies: I have personally reviewed pertinent reports        Code Status: Level 1 - Full Code    Jessica Pool PA-C  8/19/2019

## 2019-08-20 ENCOUNTER — ANESTHESIA (OUTPATIENT)
Dept: PERIOP | Facility: HOSPITAL | Age: 59
DRG: 571 | End: 2019-08-20
Payer: MEDICARE

## 2019-08-20 LAB
ANION GAP SERPL CALCULATED.3IONS-SCNC: 12 MMOL/L (ref 4–13)
BUN SERPL-MCNC: 9 MG/DL (ref 5–25)
CALCIUM SERPL-MCNC: 9.2 MG/DL (ref 8.3–10.1)
CHLORIDE SERPL-SCNC: 107 MMOL/L (ref 100–108)
CO2 SERPL-SCNC: 22 MMOL/L (ref 21–32)
CREAT SERPL-MCNC: 0.96 MG/DL (ref 0.6–1.3)
ERYTHROCYTE [DISTWIDTH] IN BLOOD BY AUTOMATED COUNT: 12 % (ref 11.6–15.1)
GFR SERPL CREATININE-BSD FRML MDRD: 65 ML/MIN/1.73SQ M
GLUCOSE P FAST SERPL-MCNC: 96 MG/DL (ref 65–99)
GLUCOSE SERPL-MCNC: 96 MG/DL (ref 65–140)
HCT VFR BLD AUTO: 38.3 % (ref 34.8–46.1)
HGB BLD-MCNC: 11.8 G/DL (ref 11.5–15.4)
MCH RBC QN AUTO: 30.5 PG (ref 26.8–34.3)
MCHC RBC AUTO-ENTMCNC: 30.8 G/DL (ref 31.4–37.4)
MCV RBC AUTO: 99 FL (ref 82–98)
PLATELET # BLD AUTO: 340 THOUSANDS/UL (ref 149–390)
PMV BLD AUTO: 9.6 FL (ref 8.9–12.7)
POTASSIUM SERPL-SCNC: 3.6 MMOL/L (ref 3.5–5.3)
RBC # BLD AUTO: 3.87 MILLION/UL (ref 3.81–5.12)
SODIUM SERPL-SCNC: 141 MMOL/L (ref 136–145)
WBC # BLD AUTO: 12.68 THOUSAND/UL (ref 4.31–10.16)

## 2019-08-20 PROCEDURE — 11042 DBRDMT SUBQ TIS 1ST 20SQCM/<: CPT | Performed by: PHYSICIAN ASSISTANT

## 2019-08-20 PROCEDURE — 10061 I&D ABSCESS COMP/MULTIPLE: CPT | Performed by: PHYSICIAN ASSISTANT

## 2019-08-20 PROCEDURE — 0JB80ZZ EXCISION OF ABDOMEN SUBCUTANEOUS TISSUE AND FASCIA, OPEN APPROACH: ICD-10-PCS | Performed by: SURGERY

## 2019-08-20 PROCEDURE — 87205 SMEAR GRAM STAIN: CPT | Performed by: SURGERY

## 2019-08-20 PROCEDURE — 11042 DBRDMT SUBQ TIS 1ST 20SQCM/<: CPT | Performed by: SURGERY

## 2019-08-20 PROCEDURE — 99222 1ST HOSP IP/OBS MODERATE 55: CPT | Performed by: NURSE PRACTITIONER

## 2019-08-20 PROCEDURE — 11045 DBRDMT SUBQ TISS EACH ADDL: CPT | Performed by: PHYSICIAN ASSISTANT

## 2019-08-20 PROCEDURE — 85027 COMPLETE CBC AUTOMATED: CPT | Performed by: PHYSICIAN ASSISTANT

## 2019-08-20 PROCEDURE — 87075 CULTR BACTERIA EXCEPT BLOOD: CPT | Performed by: SURGERY

## 2019-08-20 PROCEDURE — 11045 DBRDMT SUBQ TISS EACH ADDL: CPT | Performed by: SURGERY

## 2019-08-20 PROCEDURE — 80048 BASIC METABOLIC PNL TOTAL CA: CPT | Performed by: PHYSICIAN ASSISTANT

## 2019-08-20 PROCEDURE — 0J980ZZ DRAINAGE OF ABDOMEN SUBCUTANEOUS TISSUE AND FASCIA, OPEN APPROACH: ICD-10-PCS | Performed by: SURGERY

## 2019-08-20 PROCEDURE — 87185 SC STD ENZYME DETCJ PER NZM: CPT | Performed by: SURGERY

## 2019-08-20 PROCEDURE — 87176 TISSUE HOMOGENIZATION CULTR: CPT | Performed by: SURGERY

## 2019-08-20 PROCEDURE — 10061 I&D ABSCESS COMP/MULTIPLE: CPT | Performed by: SURGERY

## 2019-08-20 PROCEDURE — 87070 CULTURE OTHR SPECIMN AEROBIC: CPT | Performed by: SURGERY

## 2019-08-20 PROCEDURE — 87077 CULTURE AEROBIC IDENTIFY: CPT | Performed by: SURGERY

## 2019-08-20 RX ORDER — SUCCINYLCHOLINE/SOD CL,ISO/PF 100 MG/5ML
SYRINGE (ML) INTRAVENOUS AS NEEDED
Status: DISCONTINUED | OUTPATIENT
Start: 2019-08-20 | End: 2019-08-20 | Stop reason: SURG

## 2019-08-20 RX ORDER — OXYCODONE HYDROCHLORIDE AND ACETAMINOPHEN 5; 325 MG/1; MG/1
2 TABLET ORAL EVERY 6 HOURS PRN
Status: DISCONTINUED | OUTPATIENT
Start: 2019-08-20 | End: 2019-08-21

## 2019-08-20 RX ORDER — FENTANYL CITRATE 50 UG/ML
INJECTION, SOLUTION INTRAMUSCULAR; INTRAVENOUS AS NEEDED
Status: DISCONTINUED | OUTPATIENT
Start: 2019-08-20 | End: 2019-08-20 | Stop reason: SURG

## 2019-08-20 RX ORDER — LIDOCAINE HYDROCHLORIDE 20 MG/ML
INJECTION, SOLUTION INFILTRATION; PERINEURAL AS NEEDED
Status: DISCONTINUED | OUTPATIENT
Start: 2019-08-20 | End: 2019-08-20 | Stop reason: SURG

## 2019-08-20 RX ORDER — OXYCODONE HYDROCHLORIDE AND ACETAMINOPHEN 5; 325 MG/1; MG/1
1 TABLET ORAL EVERY 4 HOURS PRN
Status: DISCONTINUED | OUTPATIENT
Start: 2019-08-20 | End: 2019-08-21

## 2019-08-20 RX ORDER — ONDANSETRON 2 MG/ML
4 INJECTION INTRAMUSCULAR; INTRAVENOUS ONCE AS NEEDED
Status: DISCONTINUED | OUTPATIENT
Start: 2019-08-20 | End: 2019-08-20 | Stop reason: HOSPADM

## 2019-08-20 RX ORDER — ONDANSETRON 2 MG/ML
INJECTION INTRAMUSCULAR; INTRAVENOUS AS NEEDED
Status: DISCONTINUED | OUTPATIENT
Start: 2019-08-20 | End: 2019-08-20 | Stop reason: SURG

## 2019-08-20 RX ORDER — CLINDAMYCIN PHOSPHATE 900 MG/50ML
900 INJECTION INTRAVENOUS ONCE
Status: DISCONTINUED | OUTPATIENT
Start: 2019-08-20 | End: 2019-08-20 | Stop reason: HOSPADM

## 2019-08-20 RX ORDER — MIDAZOLAM HYDROCHLORIDE 1 MG/ML
INJECTION INTRAMUSCULAR; INTRAVENOUS AS NEEDED
Status: DISCONTINUED | OUTPATIENT
Start: 2019-08-20 | End: 2019-08-20 | Stop reason: SURG

## 2019-08-20 RX ORDER — FENTANYL CITRATE/PF 50 MCG/ML
25 SYRINGE (ML) INJECTION
Status: DISCONTINUED | OUTPATIENT
Start: 2019-08-20 | End: 2019-08-20 | Stop reason: HOSPADM

## 2019-08-20 RX ORDER — MAGNESIUM HYDROXIDE 1200 MG/15ML
LIQUID ORAL AS NEEDED
Status: DISCONTINUED | OUTPATIENT
Start: 2019-08-20 | End: 2019-08-20 | Stop reason: HOSPADM

## 2019-08-20 RX ORDER — ALBUTEROL SULFATE 2.5 MG/3ML
2.5 SOLUTION RESPIRATORY (INHALATION) ONCE AS NEEDED
Status: DISCONTINUED | OUTPATIENT
Start: 2019-08-20 | End: 2019-08-20 | Stop reason: HOSPADM

## 2019-08-20 RX ORDER — CLINDAMYCIN PHOSPHATE 900 MG/50ML
INJECTION INTRAVENOUS AS NEEDED
Status: DISCONTINUED | OUTPATIENT
Start: 2019-08-20 | End: 2019-08-20 | Stop reason: SURG

## 2019-08-20 RX ORDER — LEVALBUTEROL 1.25 MG/.5ML
1.25 SOLUTION, CONCENTRATE RESPIRATORY (INHALATION) ONCE
Status: COMPLETED | OUTPATIENT
Start: 2019-08-20 | End: 2019-08-20

## 2019-08-20 RX ORDER — PROPOFOL 10 MG/ML
INJECTION, EMULSION INTRAVENOUS AS NEEDED
Status: DISCONTINUED | OUTPATIENT
Start: 2019-08-20 | End: 2019-08-20 | Stop reason: SURG

## 2019-08-20 RX ORDER — DEXMEDETOMIDINE HYDROCHLORIDE 100 UG/ML
INJECTION, SOLUTION INTRAVENOUS AS NEEDED
Status: DISCONTINUED | OUTPATIENT
Start: 2019-08-20 | End: 2019-08-20 | Stop reason: SURG

## 2019-08-20 RX ADMIN — PROPOFOL 200 MG: 10 INJECTION, EMULSION INTRAVENOUS at 09:13

## 2019-08-20 RX ADMIN — MORPHINE SULFATE 2 MG: 2 INJECTION, SOLUTION INTRAMUSCULAR; INTRAVENOUS at 05:31

## 2019-08-20 RX ADMIN — FAMOTIDINE 20 MG: 20 TABLET ORAL at 18:05

## 2019-08-20 RX ADMIN — CLINDAMYCIN PHOSPHATE 900 MG: 18 INJECTION, SOLUTION INTRAMUSCULAR; INTRAVENOUS at 09:05

## 2019-08-20 RX ADMIN — FAMOTIDINE 20 MG: 20 TABLET ORAL at 09:00

## 2019-08-20 RX ADMIN — HEPARIN SODIUM 5000 UNITS: 5000 INJECTION INTRAVENOUS; SUBCUTANEOUS at 21:14

## 2019-08-20 RX ADMIN — OXYCODONE HYDROCHLORIDE AND ACETAMINOPHEN 1 TABLET: 5; 325 TABLET ORAL at 11:59

## 2019-08-20 RX ADMIN — LIDOCAINE HYDROCHLORIDE 5 ML: 20 INJECTION, SOLUTION INFILTRATION; PERINEURAL at 09:13

## 2019-08-20 RX ADMIN — DOCUSATE SODIUM 100 MG: 100 CAPSULE, LIQUID FILLED ORAL at 18:05

## 2019-08-20 RX ADMIN — PIPERACILLIN SODIUM AND TAZOBACTAM SODIUM 3.38 G: 36; 4.5 INJECTION, POWDER, FOR SOLUTION INTRAVENOUS at 11:52

## 2019-08-20 RX ADMIN — LEVALBUTEROL HYDROCHLORIDE 1.25 MG: 1.25 SOLUTION, CONCENTRATE RESPIRATORY (INHALATION) at 08:44

## 2019-08-20 RX ADMIN — Medication 200 MG: at 09:13

## 2019-08-20 RX ADMIN — SODIUM CHLORIDE, SODIUM LACTATE, POTASSIUM CHLORIDE, AND CALCIUM CHLORIDE 100 ML/HR: .6; .31; .03; .02 INJECTION, SOLUTION INTRAVENOUS at 00:15

## 2019-08-20 RX ADMIN — ARIPIPRAZOLE 15 MG: 5 TABLET ORAL at 09:00

## 2019-08-20 RX ADMIN — ONDANSETRON 4 MG: 2 INJECTION INTRAMUSCULAR; INTRAVENOUS at 09:48

## 2019-08-20 RX ADMIN — MORPHINE SULFATE 2 MG: 2 INJECTION, SOLUTION INTRAMUSCULAR; INTRAVENOUS at 21:14

## 2019-08-20 RX ADMIN — HEPARIN SODIUM 5000 UNITS: 5000 INJECTION INTRAVENOUS; SUBCUTANEOUS at 14:55

## 2019-08-20 RX ADMIN — PIPERACILLIN SODIUM AND TAZOBACTAM SODIUM 3.38 G: 36; 4.5 INJECTION, POWDER, FOR SOLUTION INTRAVENOUS at 18:06

## 2019-08-20 RX ADMIN — FENTANYL CITRATE 25 MCG: 50 INJECTION, SOLUTION INTRAMUSCULAR; INTRAVENOUS at 10:30

## 2019-08-20 RX ADMIN — TRAZODONE HYDROCHLORIDE 50 MG: 50 TABLET ORAL at 21:14

## 2019-08-20 RX ADMIN — BUSPIRONE HYDROCHLORIDE 30 MG: 10 TABLET ORAL at 18:05

## 2019-08-20 RX ADMIN — BUPROPION 300 MG: 150 TABLET, EXTENDED RELEASE ORAL at 09:00

## 2019-08-20 RX ADMIN — PHENYLEPHRINE HYDROCHLORIDE 100 MCG: 10 INJECTION INTRAVENOUS at 09:20

## 2019-08-20 RX ADMIN — DOCUSATE SODIUM 100 MG: 100 CAPSULE, LIQUID FILLED ORAL at 09:00

## 2019-08-20 RX ADMIN — HEPARIN SODIUM 5000 UNITS: 5000 INJECTION INTRAVENOUS; SUBCUTANEOUS at 05:31

## 2019-08-20 RX ADMIN — OXYCODONE HYDROCHLORIDE AND ACETAMINOPHEN 1 TABLET: 5; 325 TABLET ORAL at 19:47

## 2019-08-20 RX ADMIN — FENTANYL CITRATE 25 MCG: 50 INJECTION, SOLUTION INTRAMUSCULAR; INTRAVENOUS at 10:21

## 2019-08-20 RX ADMIN — DEXMEDETOMIDINE HCL 4 MCG: 100 INJECTION INTRAVENOUS at 09:13

## 2019-08-20 RX ADMIN — MIDAZOLAM HYDROCHLORIDE 2 MG: 1 INJECTION, SOLUTION INTRAMUSCULAR; INTRAVENOUS at 09:07

## 2019-08-20 RX ADMIN — PIPERACILLIN SODIUM AND TAZOBACTAM SODIUM 3.38 G: 36; 4.5 INJECTION, POWDER, FOR SOLUTION INTRAVENOUS at 05:31

## 2019-08-20 RX ADMIN — MONTELUKAST 10 MG: 10 TABLET, FILM COATED ORAL at 21:14

## 2019-08-20 RX ADMIN — FENTANYL CITRATE 100 MCG: 50 INJECTION, SOLUTION INTRAMUSCULAR; INTRAVENOUS at 09:13

## 2019-08-20 NOTE — PLAN OF CARE
Problem: Potential for Falls  Goal: Patient will remain free of falls  Description  INTERVENTIONS:  - Assess patient frequently for physical needs  -  Identify cognitive and physical deficits and behaviors that affect risk of falls    -  [place] fall precautions as indicated by assessment   - Educate patient/family on patient safety including physical limitations  - Instruct patient to call for assistance with activity based on assessment  - Modify environment to reduce risk of injury  - Consider OT/PT consult to assist with strengthening/mobility  Outcome: Progressing     Problem: Prexisting or High Potential for Compromised Skin Integrity  Goal: Skin integrity is maintained or improved  Description  INTERVENTIONS:  - Identify patients at risk for skin breakdown  - Assess and monitor skin integrity  - Assess and monitor nutrition and hydration status  - Monitor labs   - Assess for incontinence   - Turn and reposition patient  - Assist with mobility/ambulation  - Relieve pressure over bony prominences  - Avoid friction and shearing  - Provide appropriate hygiene as needed including keeping skin clean and dry  - Evaluate need for skin moisturizer/barrier cream  - Collaborate with interdisciplinary team   - Patient/family teaching  - Consider wound care consult   Outcome: Progressing     Problem: PAIN - ADULT  Goal: Verbalizes/displays adequate comfort level or baseline comfort level  Description  Interventions:  - Encourage patient to monitor pain and request assistance  - Assess pain using appropriate pain scale  - Administer analgesics based on type and severity of pain and evaluate response  - Implement non-pharmacological measures as appropriate and evaluate response  - Consider cultural and social influences on pain and pain management  - Notify physician/advanced practitioner if interventions unsuccessful or patient reports new pain  Outcome: Progressing     Problem: INFECTION - ADULT  Goal: Absence or prevention of progression during hospitalization  Description  INTERVENTIONS:  - Assess and monitor for signs and symptoms of infection  - Monitor lab/diagnostic results  - Monitor all insertion sites, i e  indwelling lines, tubes, and drains  - Monitor endotracheal if appropriate and nasal secretions for changes in amount and color  - [place] appropriate cooling/warming therapies per order  - Administer medications as ordered  - Instruct and encourage patient and family to use good hand hygiene technique  - Identify and instruct in appropriate isolation precautions for identified infection/condition  Outcome: Progressing  Goal: Absence of fever/infection during neutropenic period  Description  INTERVENTIONS:  - Monitor WBC    Outcome: Progressing     Problem: SAFETY ADULT  Goal: Patient will remain free of falls  Description  INTERVENTIONS:  - Assess patient frequently for physical needs  -  Identify cognitive and physical deficits and behaviors that affect risk of falls    -  [place] fall precautions as indicated by assessment   - Educate patient/family on patient safety including physical limitations  - Instruct patient to call for assistance with activity based on assessment  - Modify environment to reduce risk of injury  - Consider OT/PT consult to assist with strengthening/mobility  Outcome: Progressing  Goal: Maintain or return to baseline ADL function  Description  INTERVENTIONS:  -  Assess patient's ability to carry out ADLs; assess patient's baseline for ADL function and identify physical deficits which impact ability to perform ADLs (bathing, care of mouth/teeth, toileting, grooming, dressing, etc )  - Assess/evaluate cause of self-care deficits   - Assess range of motion  - Assess patient's mobility; develop plan if impaired  - Assess patient's need for assistive devices and provide as appropriate  - Encourage maximum independence but intervene and supervise when necessary  - Involve family in performance of ADLs  - Assess for home care needs following discharge   - Consider OT consult to assist with ADL evaluation and planning for discharge  - Provide patient education as appropriate  Outcome: Progressing  Goal: Maintain or return mobility status to optimal level  Description  INTERVENTIONS:  - Assess patient's baseline mobility status (ambulation, transfers, stairs, etc )    - Identify cognitive and physical deficits and behaviors that affect mobility  - Identify mobility aids required to assist with transfers and/or ambulation (gait belt, sit-to-stand, lift, walker, cane, etc )  - [place] fall precautions as indicated by assessment  - Record patient progress and toleration of activity level on Mobility SBAR; progress patient to next Phase/Stage  - Instruct patient to call for assistance with activity based on assessment  - Consider rehabilitation consult to assist with strengthening/weightbearing, etc   Outcome: Progressing     Problem: DISCHARGE PLANNING  Goal: Discharge to home or other facility with appropriate resources  Description  INTERVENTIONS:  - Identify barriers to discharge w/patient and caregiver  - Arrange for needed discharge resources and transportation as appropriate  - Identify discharge learning needs (meds, wound care, etc )  - Arrange for interpretive services to assist at discharge as needed  - Refer to Case Management Department for coordinating discharge planning if the patient needs post-hospital services based on physician/advanced practitioner order or complex needs related to functional status, cognitive ability, or social support system  Outcome: Progressing     Problem: Knowledge Deficit  Goal: Patient/family/caregiver demonstrates understanding of disease process, treatment plan, medications, and discharge instructions  Description  Complete learning assessment and assess knowledge base    Interventions:  - Provide teaching at level of understanding  - Provide teaching via preferred learning methods  Outcome: Progressing

## 2019-08-20 NOTE — OP NOTE
OPERATIVE REPORT  PATIENT NAME: Clemente Roberts    :  1960  MRN: [id number]  Pt Location: [facility] OR ROOM 02    SURGERY DATE: 2019    Surgeon(s) and Role:     * Lindsey Morales MD - Primary     * George Coleman PA-C - Assisting    Preop Diagnosis:  Abdominal wall abscess [L02 211]  Recurrent ventral incisional hernia    Post-Op Diagnosis Codes:     * Abdominal wall abscess [L02 211]  Recurrent ventral incisional hernia    Procedure(s) (LRB):  I&D and DEBRIDEMENT WOUND ABDOMINAL ([facility] OUT) (N/A)   (Incision and drainage of subcutaneous abscess cavity with excisional debridement of nonviable tissue within the cavity)    Specimen(s):  ID Type Source Tests Collected by Time Destination   A : abdominal wall abscess Tissue Wound ANAEROBIC CULTURE AND GRAM STAIN, CULTURE, TISSUE AND GRAM STAIN Lindsey Morales MD 2019 [id number]        Estimated Blood Loss:   Minimal    Drains:  none    Anesthesia Type:   General    Operative Indications:  Abdominal wall abscess [L02 211]      Operative Findings:  Large supraumbilical abdominal wall abscess cavity extending the depth of the subcutaneous space    Complications:   None    Procedure and Technique:  The patient was see in preop holding area and the procedure was discussed with her in detail and informed consent was obtained  She was  brought to the operating room and placed in supine position  She was intubated and sedated and abdomen was prepped and draped  A pre incision time-out was carried out with the entire operating room staff confirming the correct patient and procedure as well as preoperative antibiotics  There was an area of the cm superior to the umbilicus that was draining purulent material   In the midline the subcutaneous space was incised along the previous midline scar  This was 8 cm in length  We then entered a large abscess cavity and this was suctioned and irrigated   There was some nonviable fat debrided from the subcutaneous space sharply using the bovey electrocautery  A few blue PDS sutures were visible and were not cut or opened thus staying in the preperitoneal space  The final size of the wound measured 8 x 5 x 7 5 cm extending from the skin through the depth of the subcutaneous space  Betadine and saline was used to irrigated the wound and bleeding was controlled using the bovey  The wound was then packed with kerlex and covered with abd and paper tape  She tolerated the procedure well and was extubated and trasnported to [facility]  I was present for the entire procedure, A qualified resident physician was not available and A physician assistant was required during the procedure for retraction tissue handling,dissection and suturing          Patient Disposition:  PACU  and extubated and stable    SIGNATURE: Jarad Hodge MD  DATE: August 20, 2019  TIME: 9:50 AM

## 2019-08-20 NOTE — UTILIZATION REVIEW
Initial Clinical Review    Admission: Date/Time/Statement: OBS  8/19 1644 converted to IP on 8/20 1635 for treatment of abd wall abscess     Admitting Physician Enrrique Peraza    Level of Care Med Surg    Estimated length of stay More than 2 Midnights    Certification I certify that inpatient services are medically necessary for this patient for a duration of greater than two midnights  See H&P and MD Progress Notes for additional information about the patient's course of treatment  ED Arrival Information     Expected Arrival Acuity Means of Arrival Escorted By Service Admission Type    - 8/19/2019 11:41 Urgent Walk-In Self Surgery-General Urgent    Arrival Complaint    Abdominal pain        Chief Complaint   Patient presents with    Abdominal Pain     pt c/o mid abdominal pain that started thursday  pt c/o diarrhea, denies N/V  Assessment/Plan:  Grady Bennett is a 63 y o  female with PMH of morbid obesity, asthma, and GERD, who presented with anterior periumbilical pain  Patient states about 2 weeks ago she thought she had an "obstruction", she decided take a laxative in about a week ago started to have diarrhea  A couple days ago she started to have periumbilical pain and burning  She states she had her daughter look at her abdomen and she was told that there is redness around her umbilical area with what seemed like a developing blister  She has experienced more pain prompting her to present to the emergency department  She states she has had less of an appetite over the last couple weeks  She states she is still having loose stools a couple times a day  She denies any hematochezia or melena  She denies any nausea or vomiting  She does feel more fatigued than usual   Patient states she had a laparoscopic gallbladder surgery a and later developed and umbilical hernia  This was repaired with mesh  In 2013 she had "bowel issues" and recurrence of the hernia    At that time she had small-bowel resection with removal of the mesh  Wound was left open to heal by secondary intention  She did have wound VAC therapy during this time  Patient states she has not had issue with the wound since then  She states she has had chills but denies any fevers  She has had 3 abdominal surgeries, which of those mentioned above:  Cholecystectomy, umbilical hernia repair, and umbilical hernia revision with small bowel resection  She believes her 1st hernia repair was at [facility]- when it was [place]  She states the 2nd surgery was in [place]  Ventral Incisional Hernia  Abdominal Wall fluid collection  Leukocytosis  Obesity     52-year-old he female with history of previous umbilical repair with mesh and revision with mesh removal presents with periumbilical abdominal pain with bullae caudal to her umbilicus  Area of erythema, tenderness, and induration consistent with cellulitis with diameter of approximately 15 cm  Bullae contain clear serous fluid with particulate but ruptured shortly after initial examination and produced a purulent foul-smelling discharge  Leukocytosis of 17  Patient is afebrile  CT scan showed irregular lobulated soft tissue fluid collection supraumbilical in the region consistent with patient's previous surgical scar  This measured 7 4 x 0 6 cm  Patient also with an from a umbilical hernia and a measuring 4 x 2 4 cm     Plan:  -- Admit under general surgical service and plan for I&D and wash out of abdominal wall abscess in the OR tomorrow  Cultures to be obtained at that time  -- IV antibiotics  -- NPO at midnight, and IVF  -- Warm compresses  -- Analgesia prn  -- Slim consult for medical management    8/19 IM consult    Abdominal wall abscess  Assessment & Plan  Patient with abdominal wall and history of hernia surgery in the past removal of mesh/part of her intestine  She has quite a bit of drainage from her abdominal wall    Treatment as per primary team   She is also on antibiotics as per primary team    Depression  Assessment & Plan  Continue with her home medications   Abdominal wall fluid collections  Assessment & Plan  Treatment as per primary team    Incisional hernia, without obstruction or gangrene  Assessment & Plan  Treatment as per surgery  Plan as above   GERD (gastroesophageal reflux disease)  Assessment & Plan  Continue with home medications   Asthma  Assessment & Plan  Does not appear to be in any exacerbation  Would give p r n   Nebulizer treatments if needed   Morbid obesity ([address] )  Assessment & Plan  She needs to work on her diet and exercise    OP note 8/20    I&D and DEBRIDEMENT WOUND ABDOMINAL ([address] OUT) (N/   Operative Findings:  Large supraumbilical abdominal wall abscess cavity extending the depth of the subcutaneous space     ED Triage Vitals   Temperature Pulse Respirations Blood Pressure SpO2   08/19/19 1202 08/19/19 1202 08/19/19 1202 08/19/19 1202 08/19/19 1202   98 °F (36 7 °C) 67 18 123/57 98 %      Temp Source Heart Rate Source Patient Position - Orthostatic VS BP Location FiO2 (%)   08/19/19 1147 08/19/19 1147 08/19/19 1147 08/19/19 1147 --   Oral Monitor Sitting Left arm       Pain Score       08/19/19 1202       Worst Possible Pain        Wt Readings from Last 1 Encounters:   08/19/19 111 kg (244 lb 12 8 oz)     Additional Vital Signs:   08/20/19 0827  98 3 °F (36 8 °C)  63  18  133/62    99 %  None (Room air)     08/20/19 0744  98 4 °F (36 9 °C)  65  20  123/63    98 %  None (Room air)  Lying   08/20/19 0015              None (Room air)     08/19/19 2300  98 4 °F (36 9 °C)  63  18  145/63    98 %  None (Room air)  Lying   08/19/19 1743  99 2 °F (37 3 °C)  71  19  133/71  93  97 %  None (Room air)  Lying   08/19/19 1716    70  20  147/66    99 %       08/19/19 1715    69  18  147/66    99 %       08/19/19 1615    68        97 %       08/19/19 1600    73        98 %       08/19/19 1545    69        100 %     08/19/19 1530    65  20  183/96Abnormal     99 %       08/19/19 1515    66        98 %       08/19/19 1500    63  18  186/75Abnormal     98 %       08/19/19 1445    65  20  162/72    98 %       08/19/19 1430    67  19  162/72    97 %       08/19/19 1400    63  18  159/73    97 %       08/19/19 1345    65  20  156/71    97 %       08/19/19 1245    62  20  198/74Abnormal     98 %       08/19/19 1202  98 °F (36 7 °C)  67  18  123/57    98 %  None (Room air)         Pertinent Labs/Diagnostic Test Results:   8/19 Ct abd -Irregular lobulated soft tissue lesion with surrounding hazy infiltrate, seen midline supraumbilical   See above for further details    Infraumbilical fat-containing hernia    Lobulated bulky appearing uterus presumably related to leiomyomata       Results from last 7 days   Lab Units 08/20/19  0555 08/19/19  2048 08/19/19  1215   WBC Thousand/uL 12 68*  --  17 36*   HEMOGLOBIN g/dL 11 8  --  13 4   HEMATOCRIT % 38 3  --  42 0   PLATELETS Thousands/uL 340 379 407*   NEUTROS ABS Thousands/µL  --   --  14 31*     Results from last 7 days   Lab Units 08/20/19  0555 08/19/19  1215   SODIUM mmol/L 141 138   POTASSIUM mmol/L 3 6 4 4   CHLORIDE mmol/L 107 103   CO2 mmol/L 22 25   ANION GAP mmol/L 12 10   BUN mg/dL 9 11   CREATININE mg/dL 0 96 1 03   EGFR ml/min/1 73sq m 65 60   CALCIUM mg/dL 9 2 9 5     Results from last 7 days   Lab Units 08/19/19  1215   AST U/L 23   ALT U/L 26   ALK PHOS U/L 125*   TOTAL PROTEIN g/dL 7 5   ALBUMIN g/dL 2 8*   TOTAL BILIRUBIN mg/dL 0 40     Results from last 7 days   Lab Units 08/20/19  0555 08/19/19  1215   GLUCOSE RANDOM mg/dL 96 95     Results from last 7 days   Lab Units 08/19/19  1229   LACTIC ACID mmol/L 0 7     Results from last 7 days   Lab Units 08/19/19  1215   LIPASE u/L 99     Results from last 7 days   Lab Units 08/19/19  1215   CLARITY UA  Clear   COLOR UA  Yellow   SPEC GRAV UA  1 025   PH UA  6 0   GLUCOSE UA mg/dl Negative KETONES UA mg/dl 15 (1+)*   BLOOD UA  Negative   PROTEIN UA mg/dl Trace*   NITRITE UA  Negative   BILIRUBIN UA  Small*   UROBILINOGEN UA E U /dl 0 2   LEUKOCYTES UA  Negative   WBC UA /hpf 0-1*   RBC UA /hpf 0-1*   BACTERIA UA /hpf Occasional   EPITHELIAL CELLS WET PREP /hpf Occasional       ED Treatment:   Medication Administration from 08/19/2019 1141 to 08/19/2019 1743       Date/Time Order Dose Route Action     08/19/2019 1223 sodium chloride 0 9 % bolus 1,000 mL 1,000 mL Intravenous New Bag     08/19/2019 1644 piperacillin-tazobactam (ZOSYN) 3 375 g in sodium chloride 0 9 % 50 mL IVPB 3 375 g Intravenous New Bag        Past Medical History:   Diagnosis Date    Abdominal wall fluid collections 8/19/2019    Asthma 8/19/2019    Depression     GERD (gastroesophageal reflux disease) 8/19/2019    Incisional hernia, without obstruction or gangrene 8/19/2019     Present on Admission:   Asthma   GERD (gastroesophageal reflux disease)   Incisional hernia, without obstruction or gangrene   Abdominal wall fluid collections   Abdominal wall abscess   Morbid obesity ([address] )   Depression      Admitting Diagnosis: Abdominal pain [R10 9]  Abdominal wall abscess [L02 211]  Obesity, morbid (more than 100 lbs over ideal weight or BMI > 40) ([facility]) [E66 01]  Asthma, unspecified asthma severity, unspecified whether complicated, unspecified whether persistent [J45 909]  Age/Sex: 63 y o  female  Admission Orders:    Current Facility-Administered Medications:  [MAR Hold] acetaminophen 650 mg Oral Q6H PRN     [MAR Hold] ARIPiprazole 15 mg Oral Daily     [MAR Hold] buPROPion 300 mg Oral Daily     [MAR Hold] busPIRone 30 mg Oral BID     [MAR Hold] docusate sodium 100 mg Oral BID     [MAR Hold] famotidine 20 mg Oral BID     [MAR Hold] fluticasone-vilanterol 1 puff Inhalation Daily     [MAR Hold] heparin (porcine) 5,000 Units Subcutaneous Q8H [address]     lactated ringers 100 mL/hr Intravenous Continuous     [MAR Hold] levalbuterol 1 25 mg Nebulization Q4H PRN     [MAR Hold] montelukast 10 mg Oral HS     morphine injection 2 mg Intravenous Q3H PRN x2    [MAR Hold] ondansetron 4 mg Intravenous Q6H PRN     piperacillin-tazobactam 3 375 g Intravenous Q6H     [MAR Hold] propranolol 60 mg Oral Daily     [MAR Hold] traZODone 50 mg Oral HS       IS   I&O   SCD  Reg diet to NPO   Up and OOB as melba   IP CONSULT TO [address] [place] [address] Utilization Review Department  Phone: [phone]; Fax [phone]  [email or web address]  ATTENTION: Please call with any questions or concerns to [phone]  and carefully listen to the prompts so that you are directed to the right person  Send all requests for admission clinical reviews, approved or denied determinations and any other requests to fax [phone]   All voicemails are confidential

## 2019-08-20 NOTE — ANESTHESIA PREPROCEDURE EVALUATION
Review of Systems/Medical History  Patient summary reviewed  Chart reviewed  History of anesthetic complications (urinary retention after GA)     Cardiovascular  Exercise tolerance (METS): <4, Exercise comment: Functional status limited by knee pain    Pulmonary  Not a smoker , Asthma , well controlled/ stable ,        GI/Hepatic    GERD poorly controlled,   Comment: Hx of umbilical hernia repair with mesh and revision with mesh, presents with abscess/surrounding cellulitis     Negative  ROS        Endo/Other    Obesity  morbid obesity   GYN  Negative gynecology ROS          Hematology      Comment: Abraham Kugel witness, refuses all blood products Musculoskeletal    Arthritis     Neurology  Negative neurology ROS      Psychology   Depression ,              Physical Exam    Airway    Mallampati score: II  TM Distance: >3 FB  Neck ROM: full     Dental       Cardiovascular  Rhythm: regular, Rate: normal, Murmur,     Pulmonary  No wheezes,     Other Findings        Anesthesia Plan  ASA Score- 3     Anesthesia Type- general with ASA Monitors  Additional Monitors:   Airway Plan: ETT  Comment: Pre-procedure xopenex nebulizer  Refuses all blood products, including albumin  Would accept cell saver if necessary        Plan Factors-    Induction- intravenous  Postoperative Plan- Plan for postoperative opioid use  Planned trial extubation    Informed Consent- Anesthetic plan and risks discussed with patient  I personally reviewed this patient with the CRNA  Discussed and agreed on the Anesthesia Plan with the CRNA             Lab Results   Component Value Date    WBC 12 68 (H) 08/20/2019    HGB 11 8 08/20/2019    HCT 38 3 08/20/2019    MCV 99 (H) 08/20/2019     08/20/2019     Lab Results   Component Value Date    SODIUM 141 08/20/2019    K 3 6 08/20/2019     08/20/2019    CO2 22 08/20/2019    BUN 9 08/20/2019    CREATININE 0 96 08/20/2019    GLUC 96 08/20/2019    CALCIUM 9 2 08/20/2019     Lab Results Component Value Date    ALT 26 08/19/2019    AST 23 08/19/2019    ALKPHOS 125 (H) 08/19/2019     No results found for: INR, PROTIME  No results found for: HGBA1C

## 2019-08-20 NOTE — SOCIAL WORK
CM met with pt at bedside  OBS status reviewed and signed by pt  Copy to pt and copy to MR for scanning  Pt lives with her 2 daughters Jhony Rebolledo and Anderson Corcoran in a ranch style house with 3 ANGIE  Pt is able to navigate steps and is independent with ADL's  She uses no DME's  She has used OP/PT in the past and also used [facility] services with GERALDO Plascencia  She does have anxiety and depression that is treated with medication by a psychiatrist   She cannot remember his name at the present time  She has an appointment with her PCP Dr Dilip Del Toro for this Thursday, 8/22/19  Denies substance abuse or tobacco abuse  Pt cannot have blood products due to her Buddhism beliefs  Pt uses [facility] and has no problem with co-pays  She has an Advanced Directive and her POA is her daughter Jhony Rebolledo  She does not work or drive  Her daughter Anderson Corcoran will transport home when she is medically cleared  CM will place referral to GERALDO Placsencia for [facility] services on discharge  Pt understands that she has freedom of choice  CM will follow through hospitalization  CM reviewed discharge planning process including the following: identifying help at home, patient preference for discharge planning needs, pharmacy preference, and availability of treatment team to discuss questions or concerns patient and/or family may have regarding understanding medications and recognizing signs and symptoms once discharged  CM also encouraged patient to follow up with all recommended appointments after discharge  Patient advised of importance for patient and family to participate in managing patients medical well being  CM name and role reviewed  Discharge Checklist reviewed and CM will continue to monitor for progress toward discharge goals in nursing and provider rounds

## 2019-08-20 NOTE — PERIOPERATIVE NURSING NOTE
Patient received in PACU from OR  Patient is currently awake, oriented  Following medication administration, states surgical pain is "much better" and rates as documented in flowsheets  Anticipating return to inpatient room  Patient placed on contact isolation status pending results of cultures taken in OR

## 2019-08-20 NOTE — MALNUTRITION/BMI
This medical record reflects one or more clinical indicators suggestive of malnutrition and/or morbid obesity  BMI Findings:  BMI Classifications: Morbid Obesity 45-49 9   Energy intake > energy output over time and lack of physical activity  Body mass index is 47 65 kg/m²  See Nutrition note dated 08/20/2019 for additional details  Completed nutrition assessment is viewable in the nutrition documentation

## 2019-08-20 NOTE — NURSING NOTE
Patient received from PACU, awake and alert  Transferred from stretcher to bed, noted small amount of serosanguinous fluid leaking from around bandage  Patted dry and watched  Leaking resolved  Pt lying comfortably in bed

## 2019-08-20 NOTE — PROGRESS NOTES
patient's extravasation evaluated bedside and she has no compromise to her skin or signs/symptoms of compartment syndrome  She still has slight swelling and she was instructed to continue to elevate the arm until swelling completely resolves  Patient has no further concerns regarding extravasation site

## 2019-08-20 NOTE — CONSULTS
[facility]  Consult Note - [facility] 1960, 63 y o  female MRN: [id number]    Unit/Bed#: -01 Encounter: [id number]    Primary Care Provider: Irvin Barry MD   Date and time admitted to hospital: 8/19/2019 11:46 AM      Depression  Assessment & Plan  Continue with her home medications    Abdominal wall fluid collections  Assessment & Plan  Treatment as per primary team  Postop day 0 of I and D of abdominal wall abscess    Incisional hernia, without obstruction or gangrene  Assessment & Plan  Treatment as per surgery  Plan as above    GERD (gastroesophageal reflux disease)  Assessment & Plan  Continue with home medications    Asthma  Assessment & Plan  Does not appear to be in any exacerbation  Would give p r n  Nebulizer treatments if needed    Morbid obesity ([address] )  Assessment & Plan  She needs to work on her diet and exercise    * Abdominal wall abscess  Assessment & Plan  Patient with abdominal wall and history of hernia surgery in the past removal of mesh/part of her intestine  She has quite a bit of drainage from her abdominal wall  Treatment as per primary team        Primary Service: Crystal Borjas MD  Reason for Consultation:  Medical management    Education and Discussions with Family / Patient:  Educated patient current plan of care denies any additional questions or concerns at this time  Was care plan discussed with the Primary service today?: Yes  Is patient acceptable for discharge from medicine standpoint?: Yes  Discharge Recommendations:  Resume home medication    Time Spent for Care: 20 minutes  More than 50% of total time spent on counseling and coordination of care as described above      VTE Pharmacologic Prophylaxis: Heparin / sequential compression device    Subjective:   Reports that her pain is controlled denies any chest pain chest tightness shortness of breath or difficulty breathing is agreeable to VNA, tolerating meals, ambulating without difficulty    Objective:     Vitals:   Temp (24hrs), Av 2 °F (36 8 °C), Min:97 1 °F (36 2 °C), Max:99 2 °F (37 3 °C)    Temp:  [97 1 °F (36 2 °C)-99 2 °F (37 3 °C)] 98 4 °F (36 9 °C)  HR:  [57-73] 59  Resp:  [12-28] 16  BP: (104-186)/(55-96) 121/58  SpO2:  [95 %-100 %] 98 %  Body mass index is 47 65 kg/m²  Input and Output Summary (last 24 hours): Intake/Output Summary (Last 24 hours) at 2019 1427  Last data filed at 2019 1000  Gross per 24 hour   Intake 600 ml   Output 1275 ml   Net -675 ml       Physical Exam:     Physical Exam   Constitutional: She is oriented to person, place, and time  She appears well-developed and well-nourished  HENT:   Head: Normocephalic  Eyes: Pupils are equal, round, and reactive to light  Neck: Normal range of motion  Neck supple  Cardiovascular: Normal rate  Pulmonary/Chest: Effort normal    Abdominal: Soft  Bowel sounds are normal    Musculoskeletal: Normal range of motion  Neurological: She is alert and oriented to person, place, and time  Skin: Skin is warm and dry  Psychiatric: She has a normal mood and affect  Her behavior is normal  Thought content normal    Nursing note and vitals reviewed  Additional Data:     Results from last 7 days   Lab Units 19  0555  19  1215   WBC Thousand/uL 12 68*  --  17 36*   HEMOGLOBIN g/dL 11 8  --  13 4   HEMATOCRIT % 38 3  --  42 0   PLATELETS Thousands/uL 340   < > 407*   NEUTROS PCT %  --   --  82*   LYMPHS PCT %  --   --  8*   MONOS PCT %  --   --  8   EOS PCT %  --   --  1    < > = values in this interval not displayed  Results from last 7 days   Lab Units 19  0555 19  1215   POTASSIUM mmol/L 3 6 4 4   CHLORIDE mmol/L 107 103   CO2 mmol/L 22 25   BUN mg/dL 9 11   CREATININE mg/dL 0 96 1 03   CALCIUM mg/dL 9 2 9 5   ALK PHOS U/L  --  125*   ALT U/L  --  26   AST U/L  --  23           * I Have Reviewed All Lab Data Listed Above    * Additional Pertinent Lab Tests Reviewed: [address] Admission Reviewed    Imaging: I have personally reviewed pertinent reports  Last 24 Hours Medication List:     Current Facility-Administered Medications:  acetaminophen 650 mg Oral Q6H PRN Lakshmi Zambrano, DELON    ARIPiprazole 15 mg Oral Daily Lakshmi ESTUARDO [place], PA-C    buPROPion 300 mg Oral Daily Lakshmi ESTUARDO [place], PA-C    busPIRone 30 mg Oral BID Lakshmi ESTUARDO [place], PA-C    docusate sodium 100 mg Oral BID Lakshmi ESTUARDO [place], PA-C    famotidine 20 mg Oral BID [facility]    fluticasone-vilanterol 1 puff Inhalation Daily Lakshmi ESTUARDO [place], PA-C    heparin (porcine) 5,000 Units Subcutaneous Q8H [facility] Lakshmi ESTUARDO Zambrano, DELON    lactated ringers 100 mL/hr Intravenous Continuous Bushra Mariola Zambrano, PA-BEBA Last Rate: 100 mL/hr (08/20/19 0015)   levalbuterol 1 25 mg Nebulization Q4H PRN Bushra Mariola Zambrano, PA-BEBA    montelukast 10 mg Oral HS Lakshmi ESTUARDO Zambrano, PA-C    morphine injection 2 mg Intravenous Q3H PRN [facility], [facility]    ondansetron 4 mg Intravenous Q6H PRN Bushra Mariolarichard Zambrano, PA-C    oxyCODONE-acetaminophen 1 tablet Oral Q4H PRN Bushra Mariolarichard Zambrano, PA-C    oxyCODONE-acetaminophen 2 tablet Oral Q6H PRN Bushra Mariolarichard Zambrano, PA-C    piperacillin-tazobactam 3 375 g Intravenous Q6H Bushra Mariola Zambrano, DELON Last Rate: 3 375 g (08/20/19 1152)   propranolol 60 mg Oral Daily Lakshmi ESTUARDO Zambrano, PA-C    traZODone 50 mg Oral HS Lakshmi Yajaira Zambrano, DELON         Today, Patient Was Seen By: Marcene Carrel, CRNP    ** Please Note: This note has been constructed using a voice recognition system   **

## 2019-08-21 LAB
ANION GAP SERPL CALCULATED.3IONS-SCNC: 5 MMOL/L (ref 4–13)
BUN SERPL-MCNC: 9 MG/DL (ref 5–25)
CALCIUM SERPL-MCNC: 8.9 MG/DL (ref 8.3–10.1)
CHLORIDE SERPL-SCNC: 110 MMOL/L (ref 100–108)
CO2 SERPL-SCNC: 28 MMOL/L (ref 21–32)
CREAT SERPL-MCNC: 1.03 MG/DL (ref 0.6–1.3)
ERYTHROCYTE [DISTWIDTH] IN BLOOD BY AUTOMATED COUNT: 11.9 % (ref 11.6–15.1)
GFR SERPL CREATININE-BSD FRML MDRD: 60 ML/MIN/1.73SQ M
GLUCOSE SERPL-MCNC: 87 MG/DL (ref 65–140)
HCT VFR BLD AUTO: 36.2 % (ref 34.8–46.1)
HGB BLD-MCNC: 11.2 G/DL (ref 11.5–15.4)
MCH RBC QN AUTO: 30.5 PG (ref 26.8–34.3)
MCHC RBC AUTO-ENTMCNC: 30.9 G/DL (ref 31.4–37.4)
MCV RBC AUTO: 99 FL (ref 82–98)
MRSA NOSE QL CULT: NORMAL
PLATELET # BLD AUTO: 352 THOUSANDS/UL (ref 149–390)
PMV BLD AUTO: 9.5 FL (ref 8.9–12.7)
POTASSIUM SERPL-SCNC: 4.2 MMOL/L (ref 3.5–5.3)
RBC # BLD AUTO: 3.67 MILLION/UL (ref 3.81–5.12)
SODIUM SERPL-SCNC: 143 MMOL/L (ref 136–145)
WBC # BLD AUTO: 9.13 THOUSAND/UL (ref 4.31–10.16)

## 2019-08-21 PROCEDURE — 85027 COMPLETE CBC AUTOMATED: CPT | Performed by: NURSE PRACTITIONER

## 2019-08-21 PROCEDURE — 80048 BASIC METABOLIC PNL TOTAL CA: CPT | Performed by: NURSE PRACTITIONER

## 2019-08-21 PROCEDURE — 99024 POSTOP FOLLOW-UP VISIT: CPT | Performed by: SURGERY

## 2019-08-21 PROCEDURE — 99232 SBSQ HOSP IP/OBS MODERATE 35: CPT | Performed by: PHYSICIAN ASSISTANT

## 2019-08-21 RX ORDER — ACETAMINOPHEN 325 MG/1
975 TABLET ORAL EVERY 8 HOURS SCHEDULED
Status: DISCONTINUED | OUTPATIENT
Start: 2019-08-21 | End: 2019-08-22 | Stop reason: HOSPADM

## 2019-08-21 RX ORDER — ALBUTEROL SULFATE 2.5 MG/3ML
2.5 SOLUTION RESPIRATORY (INHALATION) EVERY 4 HOURS PRN
Status: DISCONTINUED | OUTPATIENT
Start: 2019-08-21 | End: 2019-08-22 | Stop reason: HOSPADM

## 2019-08-21 RX ORDER — OXYCODONE HYDROCHLORIDE 10 MG/1
10 TABLET ORAL EVERY 6 HOURS PRN
Status: DISCONTINUED | OUTPATIENT
Start: 2019-08-21 | End: 2019-08-22 | Stop reason: HOSPADM

## 2019-08-21 RX ORDER — OXYCODONE HYDROCHLORIDE 5 MG/1
5 TABLET ORAL EVERY 4 HOURS PRN
Status: DISCONTINUED | OUTPATIENT
Start: 2019-08-21 | End: 2019-08-22 | Stop reason: HOSPADM

## 2019-08-21 RX ADMIN — HEPARIN SODIUM 5000 UNITS: 5000 INJECTION INTRAVENOUS; SUBCUTANEOUS at 13:03

## 2019-08-21 RX ADMIN — OXYCODONE HYDROCHLORIDE 10 MG: 10 TABLET ORAL at 23:07

## 2019-08-21 RX ADMIN — DOCUSATE SODIUM 100 MG: 100 CAPSULE, LIQUID FILLED ORAL at 17:17

## 2019-08-21 RX ADMIN — ARIPIPRAZOLE 15 MG: 5 TABLET ORAL at 21:10

## 2019-08-21 RX ADMIN — ACETAMINOPHEN 975 MG: 325 TABLET ORAL at 13:03

## 2019-08-21 RX ADMIN — MORPHINE SULFATE 2 MG: 2 INJECTION, SOLUTION INTRAMUSCULAR; INTRAVENOUS at 10:18

## 2019-08-21 RX ADMIN — MONTELUKAST 10 MG: 10 TABLET, FILM COATED ORAL at 21:10

## 2019-08-21 RX ADMIN — BUPROPION 300 MG: 150 TABLET, EXTENDED RELEASE ORAL at 09:07

## 2019-08-21 RX ADMIN — FAMOTIDINE 20 MG: 20 TABLET ORAL at 17:17

## 2019-08-21 RX ADMIN — PIPERACILLIN SODIUM AND TAZOBACTAM SODIUM 3.38 G: 36; 4.5 INJECTION, POWDER, FOR SOLUTION INTRAVENOUS at 01:14

## 2019-08-21 RX ADMIN — BUSPIRONE HYDROCHLORIDE 30 MG: 10 TABLET ORAL at 09:11

## 2019-08-21 RX ADMIN — HEPARIN SODIUM 5000 UNITS: 5000 INJECTION INTRAVENOUS; SUBCUTANEOUS at 05:19

## 2019-08-21 RX ADMIN — PIPERACILLIN SODIUM AND TAZOBACTAM SODIUM 3.38 G: 36; 4.5 INJECTION, POWDER, FOR SOLUTION INTRAVENOUS at 06:31

## 2019-08-21 RX ADMIN — PIPERACILLIN SODIUM AND TAZOBACTAM SODIUM 3.38 G: 36; 4.5 INJECTION, POWDER, FOR SOLUTION INTRAVENOUS at 20:05

## 2019-08-21 RX ADMIN — FAMOTIDINE 20 MG: 20 TABLET ORAL at 09:08

## 2019-08-21 RX ADMIN — HEPARIN SODIUM 5000 UNITS: 5000 INJECTION INTRAVENOUS; SUBCUTANEOUS at 21:10

## 2019-08-21 RX ADMIN — DOCUSATE SODIUM 100 MG: 100 CAPSULE, LIQUID FILLED ORAL at 09:08

## 2019-08-21 RX ADMIN — ACETAMINOPHEN 975 MG: 325 TABLET ORAL at 21:09

## 2019-08-21 RX ADMIN — MORPHINE SULFATE 2 MG: 2 INJECTION, SOLUTION INTRAMUSCULAR; INTRAVENOUS at 05:19

## 2019-08-21 RX ADMIN — PIPERACILLIN SODIUM AND TAZOBACTAM SODIUM 3.38 G: 36; 4.5 INJECTION, POWDER, FOR SOLUTION INTRAVENOUS at 13:04

## 2019-08-21 RX ADMIN — TRAZODONE HYDROCHLORIDE 50 MG: 50 TABLET ORAL at 21:09

## 2019-08-21 RX ADMIN — FLUTICASONE FUROATE AND VILANTEROL TRIFENATATE 1 PUFF: 200; 25 POWDER RESPIRATORY (INHALATION) at 09:12

## 2019-08-21 RX ADMIN — PROPRANOLOL HYDROCHLORIDE 60 MG: 60 CAPSULE, EXTENDED RELEASE ORAL at 21:06

## 2019-08-21 RX ADMIN — BUSPIRONE HYDROCHLORIDE 30 MG: 10 TABLET ORAL at 17:19

## 2019-08-21 RX ADMIN — ONDANSETRON 4 MG: 2 INJECTION INTRAMUSCULAR; INTRAVENOUS at 18:16

## 2019-08-21 NOTE — ASSESSMENT & PLAN NOTE
Patient is postop day 1, S/P OR ID and debridement    Cultures growing gram-positive cocci in pairs  Improving leukocytosis  Per primary service, packing and dressing   Treatment as per primary team

## 2019-08-21 NOTE — PROGRESS NOTES
Progress Note - Grady Bennett 1960, 63 y o  female MRN: [id number]  Unit/Bed#: -01 Encounter: [id number]  Primary Care Provider: Liudmila Nunez MD   Date and time admitted to hospital: 8/19/2019 11:46 AM    * Abdominal wall abscess  Assessment & Plan  Patient is postop day 1, S/P OR ID and debridement  Cultures growing gram-positive cocci in pairs  Improving leukocytosis  Per primary service, packing and dressing   Treatment as per primary team     Abdominal wall fluid collections  Assessment & Plan  Treatment as per primary team  Postop day 1, I&D of abdominal wall abscess  Anticipate discharge 08/22/2019    Incisional hernia, without obstruction or gangrene  Assessment & Plan  Treatment as per surgery  Plan as above    Depression  Assessment & Plan  Continue with her home medications    GERD (gastroesophageal reflux disease)  Assessment & Plan  Continue with home medications    Asthma  Assessment & Plan  Does not appear to be in any exacerbation  Would give p r n  Nebulizer treatments if needed    Morbid obesity ([address] )  Assessment & Plan  She needs to work on her diet and exercise      VTE Pharmacologic Prophylaxis:   Pharmacologic: Heparin  Mechanical VTE Prophylaxis in Place: Yes    Patient Centered Rounds: I have performed bedside rounds with nursing staff today  Discussions with Specialists or Other Care Team Provider:  General surgery, nursing    Education and Discussions with Family / Patient:  Discussed with patient    Time Spent for Care: 20 minutes  More than 50% of total time spent on counseling and coordination of care as described above  Current Length of Stay: 1 day(s)    Current Patient Status: Inpatient   Certification Statement: The patient will continue to require additional inpatient hospital stay due to Per primary    Discharge Plan:  Per primary, likely discharge 08/22/2019    Code Status: Level 1 - Full Code      Subjective:   Patient feels well  No complaints  States she needs to use the restroom and requires the nurse  Objective:     Vitals:   Temp (24hrs), Av 8 °F (36 6 °C), Min:97 6 °F (36 4 °C), Max:98 1 °F (36 7 °C)    Temp:  [97 6 °F (36 4 °C)-98 1 °F (36 7 °C)] 98 1 °F (36 7 °C)  HR:  [63-69] 66  Resp:  [16-18] 16  BP: (101-135)/(49-65) 135/65  SpO2:  [97 %-100 %] 97 %  Body mass index is 47 65 kg/m²  Input and Output Summary (last 24 hours): Intake/Output Summary (Last 24 hours) at 2019 1415  Last data filed at 2019 1401  Gross per 24 hour   Intake 1786 67 ml   Output 1500 ml   Net 286 67 ml       Physical Exam:     Physical Exam   Constitutional: She appears well-developed and well-nourished  No distress  Pleasant and fully conversational   No acute distress  Pulmonary/Chest: Effort normal  No respiratory distress  Skin: She is not diaphoretic  Psychiatric: She has a normal mood and affect  Her behavior is normal  Thought content normal    Nursing note and vitals reviewed  Additional Data:     Labs:    Results from last 7 days   Lab Units 19  04419  1215   WBC Thousand/uL 9 13   < > 17 36*   HEMOGLOBIN g/dL 11 2*   < > 13 4   HEMATOCRIT % 36 2   < > 42 0   PLATELETS Thousands/uL 352   < > 407*   NEUTROS PCT %  --   --  82*   LYMPHS PCT %  --   --  8*   MONOS PCT %  --   --  8   EOS PCT %  --   --  1    < > = values in this interval not displayed  Results from last 7 days   Lab Units 19  0449  19  1215   SODIUM mmol/L 143   < > 138   POTASSIUM mmol/L 4 2   < > 4 4   CHLORIDE mmol/L 110*   < > 103   CO2 mmol/L 28   < > 25   BUN mg/dL 9   < > 11   CREATININE mg/dL 1 03   < > 1 03   ANION GAP mmol/L 5   < > 10   CALCIUM mg/dL 8 9   < > 9 5   ALBUMIN g/dL  --   --  2 8*   TOTAL BILIRUBIN mg/dL  --   --  0 40   ALK PHOS U/L  --   --  125*   ALT U/L  --   --  26   AST U/L  --   --  23   GLUCOSE RANDOM mg/dL 87   < > 95    < > = values in this interval not displayed                   Results from last 7 days   Lab Units 08/19/19  1229   LACTIC ACID mmol/L 0 7           * I Have Reviewed All Lab Data Listed Above  * Additional Pertinent Lab Tests Reviewed: All Labs Within Last 24 Hours Reviewed      Recent Cultures (last 7 days):     Results from last 7 days   Lab Units 08/20/19  0932 08/19/19 2011 08/19/19  1229   BLOOD CULTURE   --   --  No Growth at 24 hrs  No Growth at 24 hrs  GRAM STAIN RESULT  1+ Polys*  1+ RBC's*  Rare Gram positive cocci in pairs* 2+ Disintegrating polys*  2+ Gram positive cocci in pairs*  --    WOUND CULTURE   --  3+ Growth of Streptococcus constellatus*  --        Last 24 Hours Medication List:     Current Facility-Administered Medications:  acetaminophen 975 mg Oral Q8H [address] [facility] [place], PA-C    ARIPiprazole 15 mg Oral Daily [facility], PA-C    buPROPion 300 mg Oral Daily [facility] [place], PA-C    busPIRone 30 mg Oral BID [facility] [place], PA-C    docusate sodium 100 mg Oral BID [facility] [place], PA-C    famotidine 20 mg Oral BID [facility] [place], PA-C    fluticasone-vilanterol 1 puff Inhalation Daily [facility], PA-C    heparin (porcine) 5,000 Units Subcutaneous Q8H [address] [facility], PA-C    levalbuterol 1 25 mg Nebulization Q4H [facility]N [facility], [facility]    montelukast 10 mg Oral HS [facility], PA-C    morphine injection 2 mg Intravenous Q3H [facility]N [facility], [facility]    ondansetron 4 mg Intravenous Q6H [facility]N [facility] [place], [facility]    oxyCODONE 10 mg Oral Q6H PRN [facility] [place], PA-C    oxyCODONE 5 mg Oral Q4H PRN [facility] [place], PA-C    piperacillin-tazobactam 3 375 g Intravenous Q6H [facility] [place], [facility]-C Last Rate: 3 375 g (08/21/19 1304)   propranolol 60 mg Oral Daily [facility], PA-C    traZODone 50 mg Oral HS Lakshmi Zambrano PA-C         Today, Patient Was Seen By: Ruma Montalvo PA-C    ** Please Note: Dictation voice to text software may have been used in the creation of this document   **

## 2019-08-21 NOTE — ASSESSMENT & PLAN NOTE
Treatment as per primary team  Postop day 1, I&D of abdominal wall abscess  Anticipate discharge 08/22/2019

## 2019-08-21 NOTE — PROGRESS NOTES
Progress Note -Surgery RAISSA Tsai 63 y o  female MRN: [id number]  Unit/Bed#: -01 Encounter: [id number]      Assessment   Abdominal Wall fluid collection  - POD #1, s/p I&D and debridement in OR 8/20/19 with Kerlix placed  - initial cultures with gram+ cocci in pairs  - leukocytosis resolved  - Surrounding erythema improved, along with tenderness  - packing changed, wet to dry packing placed  No purulence and very little slough/exudate, wound bed with granulation tissue  Ventral Incisional Hernia  - infraumbilical  Obesity  Chronic pain  - on hydrocodone at home for knee/back/neck pain per pt  Prescribed by PCP  Depression  - management per SLIM  - continue with current meds  Asthma  - management per SLIM  - prn xoponex  GERD  - continue home meds    Plan   - Adjust pain medications to scheduled APAP with prn Oxy  D/c percocet  - d/c IVF  - Continue antibiotics, follow cultures  - At least daily dressing changes  - Case management for [facility] wound care  - medical management per SLIM  - Likely discharge tomorrow  ______________________________________________________________________  Subjective:   Patient states pain is intermittently 8-10/10 but appears comfortable in bed  She states she feels better overall  States percocet does not help pain  She states she takes hydrocodone at home for knee/back/neck pain  Tolerating diet, no n/v  Feels better overall  Better appetite  No f/c  No palpitations, CP, or SOB  Objective:    Vitals:  /65 (BP Location: Left arm)   Pulse 66   Temp 98 1 °F (36 7 °C) (Oral)   Resp 16   Ht 5' (1 524 m)   Wt 111 kg (244 lb)   SpO2 97%   BMI 47 65 kg/m²     I/Os:  I/O last 3 completed shifts:   In: 1926 7 [P O :180; I V :1746 7]  Out: 2525 [Urine:2525]    I/O this shift:  In: 340 [P O :340]  Out: 450 [Urine:450]    Invasive Devices     Peripheral Intravenous Line            Peripheral IV 08/21/19 Right Antecubital less than 1 day Medications:  Current Facility-Administered Medications   Medication Dose Route Frequency    acetaminophen (TYLENOL) tablet 975 mg  975 mg Oral Q8H [address]    ARIPiprazole (ABILIFY) tablet 15 mg  15 mg Oral Daily    buPROPion (WELLBUTRIN XL) 24 hr tablet 300 mg  300 mg Oral Daily    busPIRone (BUSPAR) tablet 30 mg  30 mg Oral BID    docusate sodium (COLACE) capsule 100 mg  100 mg Oral BID    famotidine (PEPCID) tablet 20 mg  20 mg Oral BID    fluticasone-vilanterol (BREO ELLIPTA) 200-25 MCG/INH inhaler 1 puff  1 puff Inhalation Daily    heparin (porcine) subcutaneous injection 5,000 Units  5,000 Units Subcutaneous Q8H [address]    levalbuterol (XOPENEX) inhalation solution 1 25 mg  1 25 mg Nebulization Q4H PRN    montelukast (SINGULAIR) tablet 10 mg  10 mg Oral HS    morphine injection 2 mg  2 mg Intravenous Q3H PRN    ondansetron (ZOFRAN) injection 4 mg  4 mg Intravenous Q6H PRN    oxyCODONE (ROXICODONE) immediate release tablet 10 mg  10 mg Oral Q6H PRN    oxyCODONE (ROXICODONE) IR tablet 5 mg  5 mg Oral Q4H PRN    piperacillin-tazobactam (ZOSYN) 3 375 g in sodium chloride 0 9 % 50 mL IVPB  3 375 g Intravenous Q6H    propranolol (INDERAL LA) 24 hr capsule 60 mg  60 mg Oral Daily    traZODone (DESYREL) tablet 50 mg  50 mg Oral HS     Lab Results and Cultures:   CBC with diff:   Lab Results   Component Value Date    WBC 9 13 08/21/2019    HGB 11 2 (L) 08/21/2019    HCT 36 2 08/21/2019    MCV 99 (H) 08/21/2019     08/21/2019    MCH 30 5 08/21/2019    MCHC 30 9 (L) 08/21/2019    RDW 11 9 08/21/2019    MPV 9 5 08/21/2019    NRBC 0 08/19/2019       BMP/CMP:  Lab Results   Component Value Date    K 4 2 08/21/2019     (H) 08/21/2019    CO2 28 08/21/2019    BUN 9 08/21/2019    CREATININE 1 03 08/21/2019    CALCIUM 8 9 08/21/2019    AST 23 08/19/2019    ALT 26 08/19/2019    ALKPHOS 125 (H) 08/19/2019    EGFR 60 08/21/2019       Urinalysis:   Lab Results   Component Value Date    COLORU Yellow 08/19/2019    CLARITYU Clear 08/19/2019    SPECGRAV 1 025 08/19/2019    PHUR 6 0 08/19/2019    LEUKOCYTESUR Negative 08/19/2019    NITRITE Negative 08/19/2019    GLUCOSEU Negative 08/19/2019    KETONESU 15 (1+) (A) 08/19/2019    BILIRUBINUR Small (A) 08/19/2019    BLOODU Negative 08/19/2019      Wound Culure:   Lab Results   Component Value Date    WOUNDCULT No growth 08/19/2019     Blood Culture:   Lab Results   Component Value Date    BLOODCX No Growth at 24 hrs  08/19/2019    BLOODCX No Growth at 24 hrs  08/19/2019         Physical Exam:  General Appearance:    Alert and orientated x 3, cooperative, no distress, appears stated age   Lungs:     Clear to auscultation bilaterally, respirations unlabored, no wheezes    Heart:    Regular rate and rhythm, S1 and S2 normal, no murmur   Abdomen:    Obese, Normoactive BS, soft, tenderness on skin surrounding wound improved as well as surrounding erythema, non rigid, no masses, no palpated organomegaly  Wound/Dressing:  Granulation tissue at wound bed, <5% slough/exudate tissue, no purulence, no malodor  Extremities:  Extremities normal, no calf tenderness, no cyanosis or edema   Pulses:   2+ and symmetric all extremities   Skin:   Skin color, texture, turgor normal, no rashes   Neurologic:   CNII-XII intact, normal strength, affect appropriate       Imaging:  Ct Abdomen Pelvis Wo Contrast    Result Date: 8/19/2019  Impression: Irregular lobulated soft tissue lesion with surrounding hazy infiltrate, seen midline supraumbilical   See above for further details  Infraumbilical fat-containing hernia  Lobulated bulky appearing uterus presumably related to leiomyomata   Workstation performed: [id number]       VTE Pharmacologic Prophylaxis: Heparin  VTE Mechanical Prophylaxis: sequential compression device    Mendez Tran PA-C   8/21/2019

## 2019-08-21 NOTE — SOCIAL WORK
[facility]    H and P and facesheet faxed to [phone] review

## 2019-08-22 VITALS
RESPIRATION RATE: 20 BRPM | WEIGHT: 244 LBS | BODY MASS INDEX: 47.91 KG/M2 | HEART RATE: 56 BPM | DIASTOLIC BLOOD PRESSURE: 83 MMHG | OXYGEN SATURATION: 98 % | TEMPERATURE: 98.4 F | HEIGHT: 60 IN | SYSTOLIC BLOOD PRESSURE: 123 MMHG

## 2019-08-22 LAB
BACTERIA SPEC ANAEROBE CULT: ABNORMAL
BACTERIA SPEC ANAEROBE CULT: ABNORMAL
BACTERIA WND AEROBE CULT: ABNORMAL
GRAM STN SPEC: ABNORMAL
GRAM STN SPEC: ABNORMAL

## 2019-08-22 PROCEDURE — NC001 PR NO CHARGE: Performed by: SURGERY

## 2019-08-22 PROCEDURE — 99024 POSTOP FOLLOW-UP VISIT: CPT | Performed by: SURGERY

## 2019-08-22 RX ORDER — OXYCODONE HYDROCHLORIDE 5 MG/1
5 TABLET ORAL EVERY 4 HOURS PRN
Qty: 18 TABLET | Refills: 0 | Status: SHIPPED | OUTPATIENT
Start: 2019-08-22 | End: 2019-08-25

## 2019-08-22 RX ORDER — CEFDINIR 300 MG/1
300 CAPSULE ORAL EVERY 12 HOURS SCHEDULED
Qty: 20 CAPSULE | Refills: 0 | Status: SHIPPED | OUTPATIENT
Start: 2019-08-22 | End: 2019-09-01

## 2019-08-22 RX ORDER — CEFDINIR 300 MG/1
300 CAPSULE ORAL EVERY 12 HOURS SCHEDULED
Status: DISCONTINUED | OUTPATIENT
Start: 2019-08-22 | End: 2019-08-22 | Stop reason: HOSPADM

## 2019-08-22 RX ADMIN — PIPERACILLIN SODIUM AND TAZOBACTAM SODIUM 3.38 G: 36; 4.5 INJECTION, POWDER, FOR SOLUTION INTRAVENOUS at 12:13

## 2019-08-22 RX ADMIN — PIPERACILLIN SODIUM AND TAZOBACTAM SODIUM 3.38 G: 36; 4.5 INJECTION, POWDER, FOR SOLUTION INTRAVENOUS at 06:20

## 2019-08-22 RX ADMIN — BUPROPION 300 MG: 150 TABLET, EXTENDED RELEASE ORAL at 08:29

## 2019-08-22 RX ADMIN — FLUTICASONE FUROATE AND VILANTEROL TRIFENATATE 1 PUFF: 200; 25 POWDER RESPIRATORY (INHALATION) at 08:30

## 2019-08-22 RX ADMIN — BUSPIRONE HYDROCHLORIDE 30 MG: 10 TABLET ORAL at 08:29

## 2019-08-22 RX ADMIN — FAMOTIDINE 20 MG: 20 TABLET ORAL at 08:29

## 2019-08-22 RX ADMIN — PIPERACILLIN SODIUM AND TAZOBACTAM SODIUM 3.38 G: 36; 4.5 INJECTION, POWDER, FOR SOLUTION INTRAVENOUS at 01:00

## 2019-08-22 RX ADMIN — BUSPIRONE HYDROCHLORIDE 30 MG: 10 TABLET ORAL at 17:34

## 2019-08-22 RX ADMIN — MORPHINE SULFATE 2 MG: 2 INJECTION, SOLUTION INTRAMUSCULAR; INTRAVENOUS at 06:21

## 2019-08-22 RX ADMIN — ACETAMINOPHEN 975 MG: 325 TABLET ORAL at 06:20

## 2019-08-22 RX ADMIN — FAMOTIDINE 20 MG: 20 TABLET ORAL at 17:34

## 2019-08-22 RX ADMIN — ACETAMINOPHEN 975 MG: 325 TABLET ORAL at 13:33

## 2019-08-22 RX ADMIN — HEPARIN SODIUM 5000 UNITS: 5000 INJECTION INTRAVENOUS; SUBCUTANEOUS at 06:21

## 2019-08-22 RX ADMIN — CEFDINIR 300 MG: 300 CAPSULE ORAL at 13:33

## 2019-08-22 RX ADMIN — HEPARIN SODIUM 5000 UNITS: 5000 INJECTION INTRAVENOUS; SUBCUTANEOUS at 13:34

## 2019-08-22 RX ADMIN — DOCUSATE SODIUM 100 MG: 100 CAPSULE, LIQUID FILLED ORAL at 08:29

## 2019-08-22 NOTE — DISCHARGE INSTRUCTIONS
Follow up in the [facility] office in 1 week with Dr Duncan Frazier, call to make an appointment  [phone]    Wound Care  · Take oral pain medication 30m-1h before you know you will do dressing changes  · Use clean saline to moisten dressing before removal  Alternatively, patient may shower and allow packing to get wet and remove packing in shower  (no scrubbing of incision, but may allow to get wet with soapy water and rinse clean)  Pat dry after shower  · Rinse wound with saline  · Pack with kerlex, wet to dry  · Apply 3m stickno sting to surrounding skin  · Cover with 4x4 and ABD  · Paper tape only

## 2019-08-22 NOTE — DISCHARGE SUMMARY
Discharge Date: Discharge Summary - Nancy Beltran 63 y o  female MRN: [id number]    Unit/Bed#: -01 Encounter: [id number]    Admission Date: 8/19/2019   Discharge Date: 8/22/2019    Admitting Diagnosis:   Abdominal pain [R10 9]  Abdominal wall abscess [L02 211]  Obesity, morbid (more than 100 lbs over ideal weight or BMI > 40) (HCC) [E66 01]  Asthma, unspecified asthma severity, unspecified whether complicated, unspecified whether persistent [J45 909]    Principle/ Secondary Diagnosis:  Past Medical History:   Diagnosis Date    Abdominal wall fluid collections 8/19/2019    Asthma 8/19/2019    Depression     GERD (gastroesophageal reflux disease) 8/19/2019    Incisional hernia, without obstruction or gangrene 8/19/2019     Past Surgical History:   Procedure Laterality Date    CHOLECYSTECTOMY      SMALL INTESTINE SURGERY      UMBILICAL HERNIA REPAIR      WOUND DEBRIDEMENT N/A 8/20/2019    Procedure: I&D and DEBRIDEMENT WOUND ABDOMINAL ([address] OUT); Surgeon: Kasandra Han MD;  Location: [facility];  Service: General       Discharge Diagnoses:   Principal Problem:    Abdominal wall abscess  Active Problems: Morbid obesity (HCC)    Asthma    GERD (gastroesophageal reflux disease)    Incisional hernia, without obstruction or gangrene    Abdominal wall fluid collections    Depression      Procedures Performed:  No orders of the defined types were placed in this encounter  I&D and DEBRIDEMENT WOUND ABDOMINAL ([address] OUT) (N/A Abdomen)  Procedure(s):  I&D and DEBRIDEMENT WOUND ABDOMINAL [facility] OUT)    Consultants: Internal Medicine      Triston Ortega is a 63 y o  female who presented to the hospital on 8/19 with complaints of a periumbilical pain   Patient states pain had started approximately 2 weeks ago and patient had associated it with an "obstruction" however she noticed increased redness around her umbilical area with formation of a "blister " The pain progressively worsened which prompted her to go to the emergency department  Patient had history of ventral hernia repair with placement of mesh and subsequent mesh removal after it became infected and wound was left open to heal by secondary intention and wound VAC  CT of the abdomen and pelvis showed an irregular lobulated soft tissue fluid collection consistent with previous surgical scar  Given findings, patient was admitted to the hospital      Summary of Hospital Course: Patient was admitted and went to the OR the following day for I&D and washout of abdominal wall  Intraoperative findings showed a large supraumbilical abdominal wall abscess cavity extending the depth of the subcutaneous space  The patient tolerated the procedure well and wound was packed with kerlex and covered  POD#1 patient was stable  Intraoperative wound cultures showed gram+ cocci in pairs  Leukocytosis was resolved with improvement of erythema and tenderness  Packing was changed  POD#2 patient was found to be doing well, wound bed was clean without further findings of purulence or necrosis  Wound cultures returned growing streptococcus constellatous  Antibiotics were switched to PO cefdinir for coverage  Labs showed no evidence of leukocytosis and patient was afebrile  She was deemed appropriate for discharge home with home health for wound care  She was given instructions to follow up with the surgeon  Significant Findings, Care, Treatment and Services Provided: See Above  Ct Abdomen Pelvis Wo Contrast    Result Date: 8/19/2019  Impression: Irregular lobulated soft tissue lesion with surrounding hazy infiltrate, seen midline supraumbilical   See above for further details  Infraumbilical fat-containing hernia  Lobulated bulky appearing uterus presumably related to leiomyomata   Workstation performed: [id number]     Complications: None    Discharge Diagnosis: Abdominal wall abscess s/p I&D    Resolved Problems  Date Reviewed: 8/21/2019    None        Principal Problem: Abdominal wall abscess  Active Problems: Morbid obesity (HCC)    Asthma    GERD (gastroesophageal reflux disease)    Incisional hernia, without obstruction or gangrene    Abdominal wall fluid collections    Depression      Condition at Discharge: good         Discharge instructions/Information to patient and family:   See after visit summary for information provided to patient and family  Provisions for Follow-Up Care:  See after visit summary for information related to follow-up care and any pertinent home health orders  PCP: Oscar Fournier MD    Disposition: See After Visit Summary for discharge disposition information  Planned Readmission: No    Discharge Statement   I spent 30 minutes discharging the patient  This time was spent on the day of discharge  I had direct contact with the patient on the day of discharge  Additional documentation is required if more than 30 minutes were spent on discharge  Discharge Medications:  See after visit summary for reconciled discharge medications provided to patient and family

## 2019-08-22 NOTE — NURSING NOTE
Discharge order placed, IV removed, instructions reviewed and given to patient, all questions answered    Dressing is clean dry and intact for discharge, Home health set up for patient in am

## 2019-08-22 NOTE — PLAN OF CARE
Problem: Potential for Falls  Goal: Patient will remain free of falls  Description  INTERVENTIONS:  - Assess patient frequently for physical needs  -  Identify cognitive and physical deficits and behaviors that affect risk of falls    -  [place] fall precautions as indicated by assessment   - Educate patient/family on patient safety including physical limitations  - Instruct patient to call for assistance with activity based on assessment  - Modify environment to reduce risk of injury  - Consider OT/PT consult to assist with strengthening/mobility  8/22/2019 1547 by Sole Johnson RN  Outcome: Adequate for Discharge  8/22/2019 0939 by Sole Johnson RN  Outcome: Progressing     Problem: Prexisting or High Potential for Compromised Skin Integrity  Goal: Skin integrity is maintained or improved  Description  INTERVENTIONS:  - Identify patients at risk for skin breakdown  - Assess and monitor skin integrity  - Assess and monitor nutrition and hydration status  - Monitor labs   - Assess for incontinence   - Turn and reposition patient  - Assist with mobility/ambulation  - Relieve pressure over bony prominences  - Avoid friction and shearing  - Provide appropriate hygiene as needed including keeping skin clean and dry  - Evaluate need for skin moisturizer/barrier cream  - Collaborate with interdisciplinary team   - Patient/family teaching  - Consider wound care consult   8/22/2019 1547 by Sole Johnson RN  Outcome: Adequate for Discharge  8/22/2019 0939 by Sole Johnson RN  Outcome: Progressing     Problem: PAIN - ADULT  Goal: Verbalizes/displays adequate comfort level or baseline comfort level  Description  Interventions:  - Encourage patient to monitor pain and request assistance  - Assess pain using appropriate pain scale  - Administer analgesics based on type and severity of pain and evaluate response  - Implement non-pharmacological measures as appropriate and evaluate response  - Consider cultural and social influences on pain and pain management  - Notify physician/advanced practitioner if interventions unsuccessful or patient reports new pain  8/22/2019 1547 by Claudia Huynh RN  Outcome: Adequate for Discharge  8/22/2019 0939 by Claudia Huynh RN  Outcome: Progressing     Problem: INFECTION - ADULT  Goal: Absence or prevention of progression during hospitalization  Description  INTERVENTIONS:  - Assess and monitor for signs and symptoms of infection  - Monitor lab/diagnostic results  - Monitor all insertion sites, i e  indwelling lines, tubes, and drains  - Monitor endotracheal if appropriate and nasal secretions for changes in amount and color  - [place] appropriate cooling/warming therapies per order  - Administer medications as ordered  - Instruct and encourage patient and family to use good hand hygiene technique  - Identify and instruct in appropriate isolation precautions for identified infection/condition  8/22/2019 1547 by Claudia Huynh RN  Outcome: Adequate for Discharge  8/22/2019 0939 by Claudia Huynh RN  Outcome: Progressing  Goal: Absence of fever/infection during neutropenic period  Description  INTERVENTIONS:  - Monitor WBC    8/22/2019 1547 by Claudia Huynh RN  Outcome: Adequate for Discharge  8/22/2019 0939 by Claudia Huynh RN  Outcome: Progressing     Problem: SAFETY ADULT  Goal: Patient will remain free of falls  Description  INTERVENTIONS:  - Assess patient frequently for physical needs  -  Identify cognitive and physical deficits and behaviors that affect risk of falls    -  [place] fall precautions as indicated by assessment   - Educate patient/family on patient safety including physical limitations  - Instruct patient to call for assistance with activity based on assessment  - Modify environment to reduce risk of injury  - Consider OT/PT consult to assist with strengthening/mobility  8/22/2019 1547 by Claudia Huynh RN  Outcome: Adequate for Discharge  8/22/2019 0939 by Claudia Huynh RN  Outcome: Progressing  Goal: Maintain or return to baseline ADL function  Description  INTERVENTIONS:  -  Assess patient's ability to carry out ADLs; assess patient's baseline for ADL function and identify physical deficits which impact ability to perform ADLs (bathing, care of mouth/teeth, toileting, grooming, dressing, etc )  - Assess/evaluate cause of self-care deficits   - Assess range of motion  - Assess patient's mobility; develop plan if impaired  - Assess patient's need for assistive devices and provide as appropriate  - Encourage maximum independence but intervene and supervise when necessary  - Involve family in performance of ADLs  - Assess for home care needs following discharge   - Consider OT consult to assist with ADL evaluation and planning for discharge  - Provide patient education as appropriate  8/22/2019 1547 by Julian Kruse RN  Outcome: Adequate for Discharge  8/22/2019 0939 by Julian Kruse RN  Outcome: Progressing  Goal: Maintain or return mobility status to optimal level  Description  INTERVENTIONS:  - Assess patient's baseline mobility status (ambulation, transfers, stairs, etc )    - Identify cognitive and physical deficits and behaviors that affect mobility  - Identify mobility aids required to assist with transfers and/or ambulation (gait belt, sit-to-stand, lift, walker, cane, etc )  - [place] fall precautions as indicated by assessment  - Record patient progress and toleration of activity level on Mobility SBAR; progress patient to next Phase/Stage  - Instruct patient to call for assistance with activity based on assessment  - Consider rehabilitation consult to assist with strengthening/weightbearing, etc   8/22/2019 1547 by Julian Kruse RN  Outcome: Adequate for Discharge  8/22/2019 0939 by Julian Kruse RN  Outcome: Progressing     Problem: DISCHARGE PLANNING  Goal: Discharge to home or other facility with appropriate resources  Description  INTERVENTIONS:  - Identify barriers to discharge w/patient and caregiver  - Arrange for needed discharge resources and transportation as appropriate  - Identify discharge learning needs (meds, wound care, etc )  - Arrange for interpretive services to assist at discharge as needed  - Refer to Case Management Department for coordinating discharge planning if the patient needs post-hospital services based on physician/advanced practitioner order or complex needs related to functional status, cognitive ability, or social support system  8/22/2019 1547 by Nabila Banks RN  Outcome: Adequate for Discharge  8/22/2019 0939 by Nabila Banks RN  Outcome: Progressing     Problem: Knowledge Deficit  Goal: Patient/family/caregiver demonstrates understanding of disease process, treatment plan, medications, and discharge instructions  Description  Complete learning assessment and assess knowledge base  Interventions:  - Provide teaching at level of understanding  - Provide teaching via preferred learning methods  8/22/2019 1547 by Nabila Banks RN  Outcome: Adequate for Discharge  8/22/2019 0939 by Nabila Banks RN  Outcome: Progressing     Problem: Nutrition/Hydration-ADULT  Goal: Nutrient/Hydration intake appropriate for improving, restoring or maintaining nutritional needs  Description  Monitor and assess patient's nutrition/hydration status for malnutrition  Collaborate with interdisciplinary team and initiate plan and interventions as ordered  Monitor patient's weight and dietary intake as ordered or per policy  Utilize nutrition screening tool and intervene as necessary  Determine patient's food preferences and provide high-protein, high-caloric foods as appropriate       INTERVENTIONS:  - Monitor oral intake, urinary output, labs, and treatment plans  - Assess nutrition and hydration status and recommend course of action  - Evaluate amount of meals eaten  - Assist patient with eating if necessary   - Allow adequate time for meals  - Recommend/ encourage appropriate diets, oral nutritional supplements, and vitamin/mineral supplements  - Order, calculate, and assess calorie counts as needed  - Recommend, monitor, and adjust tube feedings and TPN/PPN based on assessed needs  - Assess need for intravenous fluids  - Provide specific nutrition/hydration education as appropriate  - Include patient/family/caregiver in decisions related to nutrition  8/22/2019 1547 by Amira Khan RN  Outcome: Adequate for Discharge  8/22/2019 0939 by Amira Khan RN  Outcome: Progressing     Problem: SKIN/TISSUE INTEGRITY - ADULT  Goal: Incision(s), wounds(s) or drain site(s) healing without S/S of infection  Description  INTERVENTIONS  - Assess and document risk factors for skin impairment   - Assess and document dressing, incision, wound bed, drain sites and surrounding tissue  - Consider nutrition services referral as needed  - Oral mucous membranes remain intact  - Provide patient/ family education  8/22/2019 1547 by Amira Khan RN  Outcome: Adequate for Discharge  8/22/2019 0939 by Amira Khan RN  Outcome: Progressing

## 2019-08-22 NOTE — SOCIAL WORK
Pt for discharge home today  Pt will return home with family and [facility]  Per [facility] they are able to start care on 8/23  They are aware pt being discharged home today  Pt reports family will transport  IMM reviewed and signed by pt  Copy provided  LACE score of 57 and pt is not a re-admit  CM to follow as needed

## 2019-08-22 NOTE — PROGRESS NOTES
Progress Note - General Surgery   Micah Spine Quin 63 y o  female MRN: [id number]  Unit/Bed#: -01 Encounter: [id number]    Assessment/Plan  Marta Joyce is a 63 y o  female     1  POD#2 s/p I&D of abdominal wall fluid collection  Continue current care  Awaiting approval for for home health services  Continue IV zosyn  Wound cultures growing streptococcus constellatous, sensitive to ceftriaxone and vancomycin  Will discuss with attending regarding transition to PO antibiotics  If patient approved for home health and transitioned to PO antibiotics, tentative discharge home  Daily dressing changes with wet kerlex for packing and cover with dry 4x4 and ABD  Ambulation/OOB  Pain control PRN  Will d/c IV narcotics  Regular diet as tolerated     Subjective/Objective     Subjective: No acute events overnight  Patient complaining of unchanged incisional pain this AM  No fevers or chills  Tolerating diet, having bowel function  Objective:     Blood pressure 125/56, pulse 62, temperature 98 8 °F (37 1 °C), temperature source Oral, resp  rate 16, height 5' (1 524 m), weight 111 kg (244 lb), SpO2 96 %  ,Body mass index is 47 65 kg/m²        Intake/Output Summary (Last 24 hours) at 8/22/2019 0858  Last data filed at 8/22/2019 0052  Gross per 24 hour   Intake 640 ml   Output 1200 ml   Net -560 ml       Invasive Devices     Peripheral Intravenous Line            Peripheral IV 08/21/19 Right Antecubital 1 day                Physical Exam: /56 (BP Location: Left arm)   Pulse 62   Temp 98 8 °F (37 1 °C) (Oral)   Resp 16   Ht 5' (1 524 m)   Wt 111 kg (244 lb)   SpO2 96%   BMI 47 65 kg/m²   General appearance: alert and oriented, in no acute distress  Lungs: clear to auscultation bilaterally  Heart: regular rate and rhythm, S1, S2 normal, no murmur, click, rub or gallop  Abdomen: soft, non-distended, active bowel sounds, incision packed with wet to dry dressing, surrounding erythema and induration improving, appropriate incisional tenderness    Lab, Imaging and other studies:I have personally reviewed pertinent lab results       VTE Pharmacologic Prophylaxis: Heparin  VTE Mechanical Prophylaxis: sequential compression device    Recent Results (from the past 36 hour(s))   Basic metabolic panel    Collection Time: 08/21/19  4:49 AM   Result Value Ref Range    Sodium 143 136 - 145 mmol/L    Potassium 4 2 3 5 - 5 3 mmol/L    Chloride 110 (H) 100 - 108 mmol/L    CO2 28 21 - 32 mmol/L    ANION GAP 5 4 - 13 mmol/L    BUN 9 5 - 25 mg/dL    Creatinine 1 03 0 60 - 1 30 mg/dL    Glucose 87 65 - 140 mg/dL    Calcium 8 9 8 3 - 10 1 mg/dL    eGFR 60 ml/min/1 73sq m   CBC    Collection Time: 08/21/19  4:49 AM   Result Value Ref Range    WBC 9 13 4 31 - 10 16 Thousand/uL    RBC 3 67 (L) 3 81 - 5 12 Million/uL    Hemoglobin 11 2 (L) 11 5 - 15 4 g/dL    Hematocrit 36 2 34 8 - 46 1 %    MCV 99 (H) 82 - 98 fL    MCH 30 5 26 8 - 34 3 pg    MCHC 30 9 (L) 31 4 - 37 4 g/dL    RDW 11 9 11 6 - 15 1 %    Platelets 281 349 - 803 Thousands/uL    MPV 9 5 8 9 - 12 7 fL

## 2019-08-22 NOTE — PLAN OF CARE
Problem: Potential for Falls  Goal: Patient will remain free of falls  Description  INTERVENTIONS:  - Assess patient frequently for physical needs  -  Identify cognitive and physical deficits and behaviors that affect risk of falls    -  [place] fall precautions as indicated by assessment   - Educate patient/family on patient safety including physical limitations  - Instruct patient to call for assistance with activity based on assessment  - Modify environment to reduce risk of injury  - Consider OT/PT consult to assist with strengthening/mobility  Outcome: Progressing     Problem: Prexisting or High Potential for Compromised Skin Integrity  Goal: Skin integrity is maintained or improved  Description  INTERVENTIONS:  - Identify patients at risk for skin breakdown  - Assess and monitor skin integrity  - Assess and monitor nutrition and hydration status  - Monitor labs   - Assess for incontinence   - Turn and reposition patient  - Assist with mobility/ambulation  - Relieve pressure over bony prominences  - Avoid friction and shearing  - Provide appropriate hygiene as needed including keeping skin clean and dry  - Evaluate need for skin moisturizer/barrier cream  - Collaborate with interdisciplinary team   - Patient/family teaching  - Consider wound care consult   Outcome: Progressing     Problem: PAIN - ADULT  Goal: Verbalizes/displays adequate comfort level or baseline comfort level  Description  Interventions:  - Encourage patient to monitor pain and request assistance  - Assess pain using appropriate pain scale  - Administer analgesics based on type and severity of pain and evaluate response  - Implement non-pharmacological measures as appropriate and evaluate response  - Consider cultural and social influences on pain and pain management  - Notify physician/advanced practitioner if interventions unsuccessful or patient reports new pain  Outcome: Progressing     Problem: INFECTION - ADULT  Goal: Absence or prevention of progression during hospitalization  Description  INTERVENTIONS:  - Assess and monitor for signs and symptoms of infection  - Monitor lab/diagnostic results  - Monitor all insertion sites, i e  indwelling lines, tubes, and drains  - Monitor endotracheal if appropriate and nasal secretions for changes in amount and color  - [place] appropriate cooling/warming therapies per order  - Administer medications as ordered  - Instruct and encourage patient and family to use good hand hygiene technique  - Identify and instruct in appropriate isolation precautions for identified infection/condition  Outcome: Progressing  Goal: Absence of fever/infection during neutropenic period  Description  INTERVENTIONS:  - Monitor WBC    Outcome: Progressing     Problem: SKIN/TISSUE INTEGRITY - ADULT  Goal: Incision(s), wounds(s) or drain site(s) healing without S/S of infection  Description  INTERVENTIONS  - Assess and document risk factors for skin impairment   - Assess and document dressing, incision, wound bed, drain sites and surrounding tissue  - Consider nutrition services referral as needed  - Oral mucous membranes remain intact  - Provide patient/ family education  Outcome: Progressing     Problem: Knowledge Deficit  Goal: Patient/family/caregiver demonstrates understanding of disease process, treatment plan, medications, and discharge instructions  Description  Complete learning assessment and assess knowledge base    Interventions:  - Provide teaching at level of understanding  - Provide teaching via preferred learning methods  Outcome: Progressing     Problem: DISCHARGE PLANNING  Goal: Discharge to home or other facility with appropriate resources  Description  INTERVENTIONS:  - Identify barriers to discharge w/patient and caregiver  - Arrange for needed discharge resources and transportation as appropriate  - Identify discharge learning needs (meds, wound care, etc )  - Arrange for interpretive services to assist at discharge as needed  - Refer to Case Management Department for coordinating discharge planning if the patient needs post-hospital services based on physician/advanced practitioner order or complex needs related to functional status, cognitive ability, or social support system  Outcome: Progressing

## 2019-08-23 LAB
BACTERIA TISS AEROBE CULT: ABNORMAL
BACTERIA TISS AEROBE CULT: ABNORMAL
GRAM STN SPEC: ABNORMAL

## 2019-08-24 LAB
BACTERIA BLD CULT: NORMAL
BACTERIA BLD CULT: NORMAL

## 2019-09-04 ENCOUNTER — OFFICE VISIT (OUTPATIENT)
Dept: SURGERY | Facility: CLINIC | Age: 59
End: 2019-09-04

## 2019-09-04 VITALS
HEIGHT: 60 IN | WEIGHT: 248 LBS | SYSTOLIC BLOOD PRESSURE: 126 MMHG | DIASTOLIC BLOOD PRESSURE: 78 MMHG | BODY MASS INDEX: 48.69 KG/M2 | RESPIRATION RATE: 15 BRPM | HEART RATE: 88 BPM | TEMPERATURE: 98.6 F

## 2019-09-04 DIAGNOSIS — L02.211 ABDOMINAL WALL ABSCESS: Primary | ICD-10-CM

## 2019-09-04 PROCEDURE — 99024 POSTOP FOLLOW-UP VISIT: CPT | Performed by: SURGERY

## 2019-09-04 NOTE — PROGRESS NOTES
Assessment/Plan:  Ms Sofi Mcmahan is healing well  She had a very large abscess and it will take several more weeks to heal but there has been significant progress  She does have an area that tracks deep toward the fascial layer as the remaining subcutaneous space has contracted around it  This can be packed more loosely to facilitate closing  Otherwise she should continue the wound care as they are doing it now  Subjective:      Patient ID: Shira Perez is a 63 y o  female  Triage Notes: pt states she is not currently having any pain  She has some drainage, redness, no puss and believes it is healing  She is concerned with a pocket at the wound site  No fevers, No nausea, Fair appetite  Ms Sofi Mcmahan is returning post hospitalization  She presented with a large abscess of the anterior abdominal wall and underwent an operative incision and debridement on 8/20  She was discharged home with wet to dry packing which she had been performing once daily (with the [facility] service)  She reports a pocket that is very deep but otherwise has no odor or fevers or chills  She is accompanied today by a friend  Review of Systems      Objective: There were no vitals taken for this visit  Physical Exam   Constitutional: She is oriented to person, place, and time  She appears well-developed and well-nourished  No distress  HENT:   Head: Normocephalic and atraumatic  Eyes: Pupils are equal, round, and reactive to light  EOM are normal    Neck: Normal range of motion  Neck supple  Cardiovascular: Normal rate and regular rhythm  Pulmonary/Chest: Effort normal and breath sounds normal    Abdominal: Soft  She exhibits no distension  There is no tenderness  There is no guarding  Large midline wound of the upper abdomen (superior to the umbilicus) is granulating  There is a deeper small tract that probes to the fascia   This was cleaned and repacked with strip guaze and dry guaze was used for the remainder  Musculoskeletal: Normal range of motion  Neurological: She is alert and oriented to person, place, and time  Skin: Skin is warm and dry  She is not diaphoretic  Psychiatric: She has a normal mood and affect  Nursing note and vitals reviewed

## 2019-09-23 ENCOUNTER — OFFICE VISIT (OUTPATIENT)
Dept: SURGERY | Facility: CLINIC | Age: 59
End: 2019-09-23

## 2019-09-23 VITALS
SYSTOLIC BLOOD PRESSURE: 126 MMHG | WEIGHT: 248 LBS | HEART RATE: 64 BPM | TEMPERATURE: 98.3 F | HEIGHT: 60 IN | DIASTOLIC BLOOD PRESSURE: 64 MMHG | RESPIRATION RATE: 16 BRPM | BODY MASS INDEX: 48.69 KG/M2

## 2019-09-23 DIAGNOSIS — L02.211 ABDOMINAL WALL ABSCESS: Primary | ICD-10-CM

## 2019-09-23 PROCEDURE — 99024 POSTOP FOLLOW-UP VISIT: CPT | Performed by: SURGERY

## 2019-09-23 NOTE — PROGRESS NOTES
Assessment/Plan:       Her wound is progressing well and continues to granulated  Using packing for the deep portion of the wound which has mostly filled in  Followup in 2-3 weeks for repeat wound check  Continue high protein diet  Subjective:      Patient ID: Roopa Parker is a 63 y o  female  Triage Notes: pt is here today for her [address]  Pt states she is not in pain but having some discomfort especially when getting in and out of vehicles  Bowels vary between loose and strained  Appetite is not that good  No nausea or vomiting  Ms Emily Bishop is a 64 yo female presenting for hospital followup  She underwent incision and drainage of a very large anterior abdominal abscess  This was treated with several days of IV abx and bid packing which was changed to daily packing upon discharge  She reports doing relateively well with the packing but sometimes the dressing is sticking and when it is removed it is painful and causes bleeding  Review of Systems      Objective:    Vitals:    09/23/19 1115   BP: 126/64   Pulse: 64   Resp: 16   Temp: 98 3 °F (36 8 °C)     There were no vitals taken for this visit  Physical Exam   Constitutional: She is oriented to person, place, and time  She appears well-developed and well-nourished  No distress  HENT:   Head: Normocephalic and atraumatic  Eyes: Pupils are equal, round, and reactive to light  EOM are normal    Neck: Normal range of motion  Neck supple  Cardiovascular: Normal rate and regular rhythm  Pulmonary/Chest: Effort normal and breath sounds normal    Abdominal: Soft  She exhibits no distension  There is no tenderness  There is no guarding  Large midline wound of the upper abdomen (superior to the umbilicus) is granulating  There is a deeper small tract that probes to the fascia  This was cleaned and repacked with strip guaze and dry guaze was used for the remainder      Musculoskeletal: Normal range of motion  Neurological: She is alert and oriented to person, place, and time  Skin: Skin is warm and dry  She is not diaphoretic  Psychiatric: She has a normal mood and affect  Nursing note and vitals reviewed

## 2019-10-01 ENCOUNTER — TRANSCRIBE ORDERS (OUTPATIENT)
Dept: ADMINISTRATIVE | Facility: HOSPITAL | Age: 59
End: 2019-10-01

## 2019-10-01 DIAGNOSIS — Z12.31 SCREENING MAMMOGRAM FOR HIGH-RISK PATIENT: Primary | ICD-10-CM

## 2019-10-11 ENCOUNTER — TELEPHONE (OUTPATIENT)
Dept: SURGERY | Facility: CLINIC | Age: 59
End: 2019-10-11

## 2019-10-11 NOTE — TELEPHONE ENCOUNTER
Spoke with the provider who stated that this is not unusual, I called the patient and lmom advising her as such

## 2019-10-11 NOTE — TELEPHONE ENCOUNTER
Home health nurse [facility] called he stated that the patient is reporting sharp pain around the incision site short in duration and it's occasional and not constant pain level eight when it occurs  Is this to be expected after surgery? Patient had a I&d and debridement of abdominal abscess with washout on 8/20

## 2019-10-14 ENCOUNTER — OFFICE VISIT (OUTPATIENT)
Dept: SURGERY | Facility: CLINIC | Age: 59
End: 2019-10-14

## 2019-10-14 VITALS — HEIGHT: 60 IN | BODY MASS INDEX: 51.04 KG/M2 | RESPIRATION RATE: 12 BRPM | TEMPERATURE: 97.7 F | WEIGHT: 260 LBS

## 2019-10-14 DIAGNOSIS — L02.211 ABDOMINAL WALL ABSCESS: ICD-10-CM

## 2019-10-14 DIAGNOSIS — S31.109D OPEN WOUND OF ANTERIOR ABDOMINAL WALL, SUBSEQUENT ENCOUNTER: Primary | ICD-10-CM

## 2019-10-14 PROCEDURE — 99024 POSTOP FOLLOW-UP VISIT: CPT | Performed by: SURGERY

## 2019-12-08 NOTE — PROGRESS NOTES
Assessment/Plan:       Diagnoses and all orders for this visit:    Open wound of anterior abdominal wall, subsequent encounter    Abdominal wall abscess       This wound has fully healed  She may followup as needed  Subjective:      Patient ID: Artie Alexandra is a 63 y o  female  Triage Notes:    Ms Tresa Bee is a 64 yo female presenting for followup  She underwent incision and drainage of a very large anterior abdominal abscess which has been healing secondarily with daily packing changes  She reports the wound is healed and she has some minimal soreness but otherwise is doing well  The following portions of the patient's history were reviewed and updated as appropriate: allergies, current medications, past family history, past medical history, past social history, past surgical history and problem list     Review of Systems      Objective:      Temp 97 7 °F (36 5 °C) (Oral)   Resp 12   Ht 5' (1 524 m)   Wt 118 kg (260 lb)   BMI 50 78 kg/m²          Physical Exam   Constitutional: She is oriented to person, place, and time  She appears well-developed and well-nourished  No distress  HENT:   Head: Normocephalic and atraumatic  Eyes: Pupils are equal, round, and reactive to light  EOM are normal    Neck: Normal range of motion  Neck supple  Cardiovascular: Normal rate and regular rhythm  Pulmonary/Chest: Effort normal and breath sounds normal    Abdominal: Soft  She exhibits no distension  There is no tenderness  There is no guarding  Large midline wound of the upper abdomen (superior to the umbilicus) is healed  Musculoskeletal: Normal range of motion  Neurological: She is alert and oriented to person, place, and time  Skin: Skin is warm and dry  She is not diaphoretic  Psychiatric: She has a normal mood and affect  Nursing note and vitals reviewed

## 2020-05-07 NOTE — ANESTHESIA POSTPROCEDURE EVALUATION
Post-Op Assessment Note    CV Status:  Stable  Pain Score: 4    Pain management: adequate     Mental Status:  Alert and awake   Hydration Status:  Stable   PONV Controlled:  None   Airway Patency:  Patent and adequate   Post Op Vitals Reviewed: Yes      Staff: Anesthesiologist, CRNA           BP   123/56   Temp   97 5   Pulse  69   Resp   18   SpO2   98% <<----- Click to add NO significant Past Surgical History

## 2021-05-14 ENCOUNTER — TRANSCRIBE ORDERS (OUTPATIENT)
Dept: NEUROLOGY | Facility: CLINIC | Age: 61
End: 2021-05-14

## 2021-05-14 DIAGNOSIS — R25.1 TREMOR, UNSPECIFIED: Primary | ICD-10-CM

## 2021-06-14 ENCOUNTER — TELEPHONE (OUTPATIENT)
Dept: NEUROSURGERY | Facility: CLINIC | Age: 61
End: 2021-06-14

## 2021-06-14 NOTE — TELEPHONE ENCOUNTER
6/14/21  SHANNON CALLED FROM DR Kam Ramires OFFICE  REFERRING PT FOR MASS, LESION ON BRAIN  FAXING OVER ORDER WITH MRI BRAIN REPORT DONE AT [facility]  ADVISED JOÃO WILL CALL PT TO COMPLETE INTAKE THEN SEND FOR REVIEW BEFORE WE SCHEDULE PT FOR APPT    SHE WAS APPRECIATIVE

## 2021-06-15 ENCOUNTER — TRANSCRIBE ORDERS (OUTPATIENT)
Dept: NEUROSURGERY | Facility: CLINIC | Age: 61
End: 2021-06-15

## 2021-06-15 DIAGNOSIS — G93.5 CHIARI MALFORMATION TYPE I (HCC): ICD-10-CM

## 2021-06-15 DIAGNOSIS — G93.0 ARACHNOID CYST: Primary | ICD-10-CM

## 2021-07-07 ENCOUNTER — OFFICE VISIT (OUTPATIENT)
Dept: NEUROSURGERY | Facility: CLINIC | Age: 61
End: 2021-07-07
Payer: COMMERCIAL

## 2021-07-07 VITALS
HEIGHT: 60 IN | BODY MASS INDEX: 53.2 KG/M2 | HEART RATE: 67 BPM | SYSTOLIC BLOOD PRESSURE: 138 MMHG | DIASTOLIC BLOOD PRESSURE: 82 MMHG | TEMPERATURE: 97.4 F | WEIGHT: 271 LBS | RESPIRATION RATE: 16 BRPM

## 2021-07-07 DIAGNOSIS — G93.5 CHIARI MALFORMATION TYPE I (HCC): ICD-10-CM

## 2021-07-07 DIAGNOSIS — G93.0 ARACHNOID CYST: ICD-10-CM

## 2021-07-07 DIAGNOSIS — E66.01 MORBID OBESITY (HCC): ICD-10-CM

## 2021-07-07 DIAGNOSIS — G96.08 SUBDURAL HYGROMA: Primary | ICD-10-CM

## 2021-07-07 PROBLEM — R90.89 ABNORMAL BRAIN MRI: Status: ACTIVE | Noted: 2021-07-07

## 2021-07-07 PROCEDURE — 99203 OFFICE O/P NEW LOW 30 MIN: CPT | Performed by: NEUROLOGICAL SURGERY

## 2021-07-07 RX ORDER — SUMATRIPTAN 50 MG/1
TABLET, FILM COATED ORAL
COMMUNITY

## 2021-07-07 RX ORDER — HYDROXYZINE 50 MG/1
TABLET, FILM COATED ORAL
COMMUNITY

## 2021-07-07 RX ORDER — POLYETHYLENE GLYCOL 400 AND PROPYLENE GLYCOL 4; 3 MG/ML; MG/ML
1 SOLUTION/ DROPS OPHTHALMIC AS NEEDED
COMMUNITY

## 2021-07-07 RX ORDER — TIZANIDINE 4 MG/1
4 TABLET ORAL
COMMUNITY

## 2021-07-07 RX ORDER — TRIAMCINOLONE ACETONIDE 1 MG/G
1 CREAM TOPICAL 2 TIMES DAILY
COMMUNITY
Start: 2021-05-11

## 2021-07-07 RX ORDER — CELECOXIB 200 MG/1
CAPSULE ORAL
COMMUNITY
Start: 2021-06-09 | End: 2022-02-04 | Stop reason: ALTCHOICE

## 2021-07-07 NOTE — ASSESSMENT & PLAN NOTE
Patient presents today as a new patient for abnormal MRI  · Patient underwent MRI imaging ordered by PCP for complaints of progressive right-sided headaches  Imaging:  · MRI brain without contrast 6/11/2021:  Mild prominence of extra-axial CSF space posterior lateral to the left cerebellar hemisphere measuring approximately 1 cm which may represent arachnoid cyst   Prominence of extra-axial CSF spaces bilaterally along with convexity which may be due to atrophy worse bilateral subdural hygromas  Ventricles normal size and shape  No evidence of acute infarct  Low lying cerebellar tonsils protruding 5 mm below the foramen magnum  Plan:  · Continue monitor neurological symptoms  · MRI imaging reviewed in detail with patient and daughter  · No surgical intervention indicated  · Given degree of atrophy which appears to be out of proportion to patient's age along with persistent headaches and progressive dizziness, Neurology consultation was placed  · All questions were answered  Patient agreeable with plan  · Patient may follow up with Neurosurgery on an as-needed basis

## 2021-07-07 NOTE — ASSESSMENT & PLAN NOTE
MRI imaging demonstrated low-lying cerebellar tonsils approximately 5 mm below the foramen magnum    Plan:   · No neurosurgical intervention indicated at this juncture  · Recommend follow-up with Neurology for headache management  · Patient educated on signs symptoms of cervical radiculopathy along with myelopathy and aware to call the office with any questions concerns or new symptoms

## 2021-07-07 NOTE — PROGRESS NOTES
Neurosurgery Office Note  Gaby Reyes 62 y o  female MRN: [id number]      Assessment/Plan     Chiari malformation type I [facility])  MRI imaging demonstrated low-lying cerebellar tonsils approximately 5 mm below the foramen magnum    Plan:   · No neurosurgical intervention indicated at this juncture  · Recommend follow-up with Neurology for headache management  · Patient educated on signs symptoms of cervical radiculopathy along with myelopathy and aware to call the office with any questions concerns or new symptoms  Abnormal brain MRI  Patient presents today as a new patient for abnormal MRI  · Patient underwent MRI imaging ordered by PCP for complaints of progressive right-sided headaches  Imaging:  · MRI brain without contrast 6/11/2021:  Mild prominence of extra-axial CSF space posterior lateral to the left cerebellar hemisphere measuring approximately 1 cm which may represent arachnoid cyst   Prominence of extra-axial CSF spaces bilaterally along with convexity which may be due to atrophy worse bilateral subdural hygromas  Ventricles normal size and shape  No evidence of acute infarct  Low lying cerebellar tonsils protruding 5 mm below the foramen magnum  Plan:  · Continue monitor neurological symptoms  · MRI imaging reviewed in detail with patient and daughter  · No surgical intervention indicated  · Given degree of atrophy which appears to be out of proportion to patient's age along with persistent headaches and progressive dizziness, Neurology consultation was placed  · All questions were answered  Patient agreeable with plan  · Patient may follow up with Neurosurgery on an as-needed basis  Diagnoses and all orders for this visit:    Subdural hygroma  -     Ambulatory referral to Neurology; Future    Arachnoid cyst  -     Ambulatory referral to Neurosurgery  -     Ambulatory referral to Neurology;  Future    [facility]  -     Ambulatory referral to Neurosurgery  -     Ambulatory referral to Neurology; Future    Morbid obesity ([facility] [address] )    Other orders  -     SUMAtriptan (IMITREX) 50 mg tablet; sumatriptan 50 mg tablet   1 TABLET AT ONSET OF HEADACHE, MAY REPEAT IN 2 HOURS (MIGRAINE HEADACHE)  -     tiZANidine (ZANAFLEX) 4 mg tablet; tizanidine 4 mg tablet  -     triamcinolone (KENALOG) 0 1 % cream; APPLY THIN COAT TO AFFECTED AREA TWICE A DAY  -     celecoxib (CeleBREX) 200 mg capsule; TAKE 1 CAPSULE EVERY DAY AS NEEDED FOR 30 DAYS  -     hydrOXYzine HCL (ATARAX) 50 mg tablet; hydroxyzine HCl 50 mg tablet  -     polyethylene glycol-propylene glycol (Systane) 0 4-0 3 %; Systane (PF) 0 4 %-0 3 % eye drops in a dropperette            CHIEF COMPLAINT    Chief Complaint   Patient presents with    Consult       HISTORY    History of Present Illness     This is a 52-year-old female past medical history significant for asthma depression and gastroesophageal reflux disease who presents today as a new patient for abnormal MRI brain  Patient has longstanding history of migraines since age of 17  She has noted a new right posterior headache over the last year which is progressive  At this time she states the head pain is sharp and constant posterior to her ear which waxes and wanes in severity with radiation superiorly and at times associated with a sharp pressure in her right eye  She states the headaches are overall worse in the evening but at times also awakes with headaches  She admits to dizziness since teenage years but states this is progressive  She describes the symptoms as nausea and head heaviness  She continues with balance difficulties which occur daily worse over the last six months  At times she feels as though her legs are going to give out and note subjective knee and ankle weakness    Patient also admits to visual difficulties and blurry vision with associated cataracts and floaters in difficulty with peripheral vision for which she has follow-up with Ophthalmology  Endorses steady beat of drumming sound in left ear  In addition admits to memory difficulties  Admits to stress incontinence  Denies any double vision or speech difficulties  MRI imaging demonstrated possible left posterior fossa arachnoid cyst with significant extra-axial CSF space along bilateral vertex concerning for atrophy and a 5 mm cerebellar tonsillar descent into foramen magnum  For these, reasons patient was referred to [facility]  REVIEW OF SYSTEMS    Review of Systems   Constitutional: Negative  HENT: Positive for tinnitus (left ear drumming sound) and trouble swallowing (at times)  Eyes: Positive for photophobia (floaters, spots, periferal at times dark) and visual disturbance (double vision, relates it to cataracts)  Respiratory:        Asthma   Cardiovascular: Negative  Gastrointestinal: Negative  Endocrine: Negative  Musculoskeletal: Positive for gait problem  Skin: Negative  Allergic/Immunologic: Negative  Neurological: Positive for dizziness (constant), weakness (knees) and headaches (mirgraine, head pain on right side)  Psychiatric/Behavioral: Positive for dysphoric mood  Meds/Allergies     Current Outpatient Medications   Medication Sig Dispense Refill    albuterol (PROVENTIL HFA,VENTOLIN HFA) 90 mcg/act inhaler Inhale 2 puffs every 6 (six) hours as needed for wheezing or shortness of breath      ARIPiprazole (ABILIFY) 15 mg tablet Take 15 mg by mouth daily      buPROPion (WELLBUTRIN XL) 300 mg 24 hr tablet Take 300 mg by mouth every morning      busPIRone (BUSPAR) 10 mg tablet Take 10 mg by mouth 2 (two) times a day      celecoxib (CeleBREX) 200 mg capsule TAKE 1 CAPSULE EVERY DAY AS NEEDED FOR 30 DAYS        diclofenac sodium (VOLTAREN) 1 % Apply 2 g topically daily as needed      fluticasone-salmeterol (ADVAIR DISKUS, WIXELA INHUB) 500-50 mcg/dose inhaler Inhale 1 puff 2 (two) times a day Rinse mouth after use   metoclopramide (REGLAN) 5 mg tablet Take 5 mg by mouth 3 (three) times a day with meals      montelukast (SINGULAIR) 10 mg tablet Take 10 mg by mouth daily at bedtime      omeprazole (PriLOSEC) 40 MG capsule Take 40 mg by mouth daily      polyethylene glycol-propylene glycol (Systane) 0 4-0 3 % Systane (PF) 0 4 %-0 3 % eye drops in a dropperette      SUMAtriptan (IMITREX) 50 mg tablet sumatriptan 50 mg tablet   1 TABLET AT ONSET OF HEADACHE, MAY REPEAT IN 2 HOURS (MIGRAINE HEADACHE)      tiZANidine (ZANAFLEX) 4 mg tablet tizanidine 4 mg tablet      traZODone (DESYREL) 100 mg tablet Take 150 mg by mouth daily at bedtime       umeclidinium bromide (INCRUSE ELLIPTA) 62 5 mcg/inh AEPB inhaler Inhale 1 puff daily      hydrOXYzine HCL (ATARAX) 50 mg tablet hydroxyzine HCl 50 mg tablet      triamcinolone (KENALOG) 0 1 % cream APPLY THIN COAT TO AFFECTED AREA TWICE A DAY      zonisamide (ZONEGRAN) 100 mg capsule Take 100 mg by mouth daily (Patient not taking: Reported on 7/7/2021)       No current facility-administered medications for this visit  Allergies   Allergen Reactions    Adhesive [Medical Tape] Itching     Pt reports "it breaks down the fibers of my skin over a long period of time, and I itch, breakout, and get bumps"    Ascorbate - Food Allergy Swelling     Pineapple, mangoes, papaya  Pineapple, mangoes, papaya      Latex      Pt reports feeling "hot and flushed, double vision, shortness of breath"    Naproxen Myalgia, Other (See Comments) and Abdominal Pain     Pains of stomach flusnessof body, shortness of breath  Hot, cramps:Gi upset, Itchy and a burning sensation   Pains of stomach flusnessof body, shortness of breath      Nuts - Food Allergy     Other Itching     [place] Jelly    Risperidone Abdominal Pain, Other (See Comments) and GI Intolerance     Shortness of breath,nightmares,numbness of hands, blurred vision   Unsteady gait  Sharp pains in abdomen, Hot, itchy Shortness of breath,nightmares,numbness of hands, blurred vision  Unsteady gait         PAST HISTORY    Past Medical History:   Diagnosis Date    Abdominal wall fluid collections 8/19/2019    Asthma 8/19/2019    Depression     GERD (gastroesophageal reflux disease) 8/19/2019    Incisional hernia, without obstruction or gangrene 8/19/2019       Past Surgical History:   Procedure Laterality Date    CHOLECYSTECTOMY      SMALL INTESTINE SURGERY      UMBILICAL HERNIA REPAIR      WOUND DEBRIDEMENT N/A 8/20/2019    Procedure: I&D and DEBRIDEMENT WOUND ABDOMINAL ([address]); Surgeon: Sussy Portillo MD;  Location: [facility] OR;  Service: General       Social History     Tobacco Use    Smoking status: Never Smoker    Smokeless tobacco: Never Used   Vaping Use    Vaping Use: Never used   Substance Use Topics    Alcohol use: Not Currently    Drug use: Never       Family History   Problem Relation Age of Onset    Endocrine tumor Maternal Grandmother          Above history personally reviewed  EXAM    Vitals:Blood pressure 138/82, pulse 67, temperature (!) 97 4 °F (36 3 °C), temperature source Tympanic, resp  rate 16, height 5' (1 524 m), weight 123 kg (271 lb)  ,Body mass index is 52 93 kg/m²  Physical Exam  Vitals reviewed  Constitutional:       General: She is not in acute distress  Appearance: Normal appearance  She is well-developed  She is not ill-appearing  HENT:      Head: Normocephalic and atraumatic  Right Ear: External ear normal       Left Ear: External ear normal       Nose: Nose normal       Mouth/Throat:      Mouth: Mucous membranes are moist    Eyes:      General: No scleral icterus  Right eye: No discharge  Left eye: No discharge  Extraocular Movements: Extraocular movements intact and EOM normal       Conjunctiva/sclera: Conjunctivae normal       Pupils: Pupils are equal, round, and reactive to light  Cardiovascular:      Rate and Rhythm: Normal rate  Pulmonary:      Effort: Pulmonary effort is normal  No respiratory distress  Musculoskeletal:      Cervical back: Tenderness (Midline superior mid cervical spine) present  Skin:     General: Skin is warm and dry  Findings: No rash  Neurological:      Mental Status: She is alert  Coordination: Finger-Nose-Finger Test normal       Deep Tendon Reflexes: Strength normal       Reflex Scores:       Bicep reflexes are 1+ on the right side and 2+ on the left side  Brachioradialis reflexes are 1+ on the right side and 2+ on the left side  Psychiatric:         Mood and Affect: Mood normal          Speech: Speech normal          Behavior: Behavior normal          Thought Content: Thought content normal          Judgment: Judgment normal          Neurologic Exam     Mental Status   Follows 3 step commands  Attention: normal  Concentration: normal    Speech: speech is normal   Level of consciousness: alert  Knowledge: good  Able to perform simple calculations  Normal comprehension  Cranial Nerves     CN III, IV, VI   Pupils are equal, round, and reactive to light  Extraocular motions are normal    Right pupil: Shape: regular  Left pupil: Shape: regular  Nystagmus: none   Upgaze: normal  Conjugate gaze: present    CN V   Right facial sensation deficit: complete  Left facial sensation deficit: none    CN VII   Facial expression full, symmetric  CN VIII   Hearing: intact    CN XI   Right trapezius strength: normal  Left trapezius strength: normal    CN XII   Tongue: not atrophic  Fasciculations: absent  Tongue deviation: none    Motor Exam   Muscle bulk: normal  Overall muscle tone: normal  Right arm pronator drift: absent  Left arm pronator drift: absent    Strength   Strength 5/5 throughout       Sensory Exam   Left arm light touch: normal  Left leg light touch: normal  Left arm pinprick: normal  Left leg pinprick: normal  Diffuse decreased light touch and pinprick throughout right arm and leg      Gait, Coordination, and Reflexes     Coordination   Finger to nose coordination: normal    Tremor   Resting tremor: absent  Intention tremor: absent  Action tremor: absent    Reflexes   Right brachioradialis: 1+  Left brachioradialis: 2+  Right biceps: 1+  Left biceps: 2+  Right Rhodes: absent  Left Rhodes: absent  Right ankle clonus: absent  Left ankle clonus: absent        MEDICAL DECISION MAKING    Imaging Studies:     MRI brain w wo contrast 6/11/2021    Result Date: 7/7/2021  Narrative: 1 2 [id number] 37 2 [id number]  2 [id number]      I have personally reviewed pertinent reports     and I have personally reviewed pertinent films in PACS

## 2021-08-10 ENCOUNTER — TELEPHONE (OUTPATIENT)
Dept: NEUROLOGY | Facility: CLINIC | Age: 61
End: 2021-08-10

## 2021-08-10 ENCOUNTER — CONSULT (OUTPATIENT)
Dept: NEUROLOGY | Facility: CLINIC | Age: 61
End: 2021-08-10
Payer: COMMERCIAL

## 2021-08-10 VITALS
BODY MASS INDEX: 51.63 KG/M2 | HEIGHT: 60 IN | DIASTOLIC BLOOD PRESSURE: 84 MMHG | HEART RATE: 59 BPM | TEMPERATURE: 98.1 F | WEIGHT: 263 LBS | SYSTOLIC BLOOD PRESSURE: 133 MMHG

## 2021-08-10 DIAGNOSIS — M54.81 OCCIPITAL NEURALGIA OF RIGHT SIDE: ICD-10-CM

## 2021-08-10 DIAGNOSIS — M54.12 RIGHT CERVICAL RADICULOPATHY: ICD-10-CM

## 2021-08-10 DIAGNOSIS — H81.12 BENIGN PAROXYSMAL POSITIONAL VERTIGO, LEFT: ICD-10-CM

## 2021-08-10 DIAGNOSIS — R27.8 SENSORY ATAXIA: ICD-10-CM

## 2021-08-10 DIAGNOSIS — G43.109 MIGRAINE WITH AURA AND WITHOUT STATUS MIGRAINOSUS, NOT INTRACTABLE: Primary | ICD-10-CM

## 2021-08-10 DIAGNOSIS — G62.9 PERIPHERAL NEUROPATHY: ICD-10-CM

## 2021-08-10 DIAGNOSIS — R20.2 PARESTHESIA: ICD-10-CM

## 2021-08-10 DIAGNOSIS — G44.86 CERVICOGENIC HEADACHE: ICD-10-CM

## 2021-08-10 PROCEDURE — 99205 OFFICE O/P NEW HI 60 MIN: CPT | Performed by: PSYCHIATRY & NEUROLOGY

## 2021-08-10 RX ORDER — SUMATRIPTAN 100 MG/1
TABLET, FILM COATED ORAL
Qty: 9 TABLET | Refills: 5 | Status: SHIPPED | OUTPATIENT
Start: 2021-08-10

## 2021-08-10 NOTE — PROGRESS NOTES
Garland Roberts is a 62 y o  female Presents with complaints of headaches, neck pain and dizziness    Assessment:  1  Migraine with aura and without status migrainosus, not intractable    2  Cervicogenic headache    3  Occipital neuralgia of right side    4  Benign paroxysmal positional vertigo, left    5  Peripheral neuropathy    6  Paresthesia    7  Sensory ataxia    8  Right cervical radiculopathy        Plan:  Blood work   EMG right upper lower extremity   Physical therapy   Increase Imitrex to 100 mg p r n  headache  Follow-up 2 months    Discussion:   Aide Frankel has a long history of headaches with typical vascular features occurring every other month  She has found Imitrex 50 mg does reduce the intensity the headache but does not get rid of it and have recommended increasing the dose to the 100 mg size  In addition she has headaches which are more cervicogenic in nature with findings consistent with greater occipital neuralgia on the right side  She reports that she is currently taking Lyrica but is not on her current medication list   She will check when she gets home if she is indeed taking it and what the dose is and would consider increasing it  She also has findings consistent with right cervical radiculopathy  In addition she demonstrates changes consistent with benign positional vertigo on the left  In addition has sensory changes in the distal lower extremities consistent with peripheral neuropathy which may be contributing to some of her balance issues  Will obtain an EMG of the right upper and lower extremity given radiculopathy and neuropathy signs and will initiate physical therapy to work on vestibular issues balance issues as well as cervical pain  I will see her back in follow-up in 6-8 weeks      Subjective:    MATT Kelley is a right-handed woman who presents with the above complaints  She states that since her late teens she has had migraine headaches    She states her migraine headaches frequently or preceded by of visual disturbance of bright jagged lights in her vision  She states that a throbbing stabbing pressure type headache then ensues lateralized more to the right and across the frontal region  She states that these headaches can sometimes last for a couple of weeks and typically occur her every other month  She states that she was given a prescription for Imitrex 50 mg which she takes which she feels the headache coming on  She states that it does not get rid of the headache but does dull it a but and she will sometimes take a 2nd dose after an hour  In addition she states more recently over the last couple of years she has been getting a stabbing type pain that lateralizes to the posterior head region on the right  It lasts for a couple minutes  She states that at times the pain travels into the right frontal region  She has not noticed anything that seems to precipitate these symptoms  She does have a history of chronic neck pain which she states began when she fell down steps in 2007  She states that she was treated with physical therapy and is currently seeing pain management without significant relief  It was several years ago that she tried physical therapy  She states occasionally the pain radiates into the right arm associated with numbness and tingling in the arm  She states that she was put on gabapentin but this caused GI upset and is currently taking pregabalin but this is not on her medication list   In addition she reports dizziness  She states that when she moves her head side to side she notes a spinning sensation  She also notes when she is on her feet she has more of an off-balance feeling  She states the spinning sensation is been going on for a couple of years  Brain MRI results performed in June of this year are as follows  1   No acute intracranial abnormality     2   Probable arachnoid cyst posterolateral to the left cerebellar hemisphere with mild mass effect of midline shift   Prominence of the extra-axial CSF space along the vertex bilaterally which may be due to atrophy, but the possibility of balanced subdural hygromas which are isointense to CSF are not excluded  3   Few tiny nodular foci of hyperintense FLAIR signal involving white matter, most commonly seen with small-vessel ischemic disease   Poorly visualized vertebrobasilar flow voids which may be congenital or acquired   Low-lying cerebellar tonsils  These are unlikely these are contributing to her above symptoms      Past Medical History:   Diagnosis Date    Abdominal wall fluid collections 8/19/2019    Asthma 8/19/2019    Depression     GERD (gastroesophageal reflux disease) 8/19/2019    Incisional hernia, without obstruction or gangrene 8/19/2019       Family History:  Family History   Problem Relation Age of Onset    Endocrine tumor Maternal Grandmother        Past Surgical History:  Past Surgical History:   Procedure Laterality Date    CHOLECYSTECTOMY      SMALL INTESTINE SURGERY      UMBILICAL HERNIA REPAIR      WOUND DEBRIDEMENT N/A 8/20/2019    Procedure: I&D and DEBRIDEMENT WOUND ABDOMINAL ([address] OUT); Surgeon: Jerson Brady MD;  Location: [facility] OR;  Service: General       Social History:   reports that she has never smoked  She has never used smokeless tobacco  She reports previous alcohol use  She reports that she does not use drugs      Allergies:  Adhesive [medical tape], Ascorbate - food allergy, Latex, Naproxen, Nuts - food allergy, Other, and Risperidone      Current Outpatient Medications:     albuterol (PROVENTIL HFA,VENTOLIN HFA) 90 mcg/act inhaler, Inhale 2 puffs every 6 (six) hours as needed for wheezing or shortness of breath, Disp: , Rfl:     ARIPiprazole (ABILIFY) 15 mg tablet, Take 15 mg by mouth daily, Disp: , Rfl:     buPROPion (WELLBUTRIN XL) 300 mg 24 hr tablet, Take 300 mg by mouth every morning, Disp: , Rfl:     celecoxib (CeleBREX) 200 mg capsule, TAKE 1 CAPSULE EVERY DAY AS NEEDED FOR 30 DAYS , Disp: , Rfl:     diclofenac sodium (VOLTAREN) 1 %, Apply 2 g topically daily as needed, Disp: , Rfl:     fluticasone-salmeterol (ADVAIR DISKUS, WIXELA INHUB) 500-50 mcg/dose inhaler, Inhale 1 puff 2 (two) times a day Rinse mouth after use , Disp: , Rfl:     hydrOXYzine HCL (ATARAX) 50 mg tablet, hydroxyzine HCl 50 mg tablet, Disp: , Rfl:     metoclopramide (REGLAN) 5 mg tablet, Take 5 mg by mouth 3 (three) times a day with meals, Disp: , Rfl:     montelukast (SINGULAIR) 10 mg tablet, Take 10 mg by mouth daily at bedtime, Disp: , Rfl:     omeprazole (PriLOSEC) 40 MG capsule, Take 40 mg by mouth daily, Disp: , Rfl:     polyethylene glycol-propylene glycol (Systane) 0 4-0 3 %, Systane (PF) 0 4 %-0 3 % eye drops in a dropperette, Disp: , Rfl:     SUMAtriptan (IMITREX) 50 mg tablet, sumatriptan 50 mg tablet  1 TABLET AT ONSET OF HEADACHE, MAY REPEAT IN 2 HOURS (MIGRAINE HEADACHE), Disp: , Rfl:     tiZANidine (ZANAFLEX) 4 mg tablet, tizanidine 4 mg tablet, Disp: , Rfl:     traZODone (DESYREL) 100 mg tablet, Take 150 mg by mouth daily at bedtime , Disp: , Rfl:     triamcinolone (KENALOG) 0 1 % cream, APPLY THIN COAT TO AFFECTED AREA TWICE A DAY, Disp: , Rfl:     umeclidinium bromide (INCRUSE ELLIPTA) 62 5 mcg/inh AEPB inhaler, Inhale 1 puff daily, Disp: , Rfl:     busPIRone (BUSPAR) 10 mg tablet, Take 10 mg by mouth 2 (two) times a day (Patient not taking: Reported on 8/10/2021), Disp: , Rfl:     SUMAtriptan (IMITREX) 100 mg tablet, One p o  P r n  Headache, may repeat 1 after 2 hours p r n   Maximum 2/24 hours, Disp: 9 tablet, Rfl: 5    zonisamide (ZONEGRAN) 100 mg capsule, Take 100 mg by mouth daily (Patient not taking: Reported on 7/7/2021), Disp: , Rfl:      I have reviewed the past medical, social and family history, current medications, allergies, vitals, review of systems and updated this information as appropriate today Objective:    Vitals:  Blood pressure 133/84, pulse 59, temperature 98 1 °F (36 7 °C), temperature source Tympanic, height 5' (1 524 m), weight 119 kg (263 lb)  Physical Exam    Neurological Exam    GENERAL:  Cooperative in no acute distress  Well-developed and well-nourished    HEAD and NECK   Head is atraumatic normocephalic with no lesions or masses  Neck is supple with full range of motion  Tenderness is noted over the lateral cervical regions bilaterally  There is also tenderness over the right greater occipital nerve region    CARDIOVASCULAR  Carotid Arteries-no carotid bruits  NEUROLOGIC:  Mental Status-the patient is awake alert and oriented without aphasia or apraxia  Cranial Nerves: Visual fields are full to confrontation  Extraocular movements are full without nystagmus  Pupils are 2-1/2 mm and reactive  Face is symmetrical to light touch  Movements of facial expression move symmetrically  Hearing is normal to finger rub bilaterally  Soft palate lifts symmetrically  Shoulder shrug is symmetrical  Tongue is midline without atrophy  Motor: No drift is noted on arm extension  Strength is full in the upper and lower extremities with normal bulk and tone  Sensory:  diminished temperature and intact vibratory sensation in the  distal lower extremities bilaterally  Cortical function is intact  Coordination: Finger to nose testing is performed accurately  Romberg is  Positive with eyes closed  Gait reveals a normal base with symmetrical arm swing  Tandem walk could not be performed without stepping off  Reflexes: 1 in the right biceps brachioradialis regions and 1+ in the left in the embolic is regions  Trace at the triceps, 1 at the knees and trace at the ankles  Toes are downgoing  Hallpike maneuver was performed and produced vertigo with head down to the right associated with nausea            ROS:    Review of Systems   Constitutional: Positive for appetite change and fatigue   Negative for fever    HENT: Positive for trouble swallowing (feels her throat is narrowing)  Negative for hearing loss, tinnitus and voice change  Eyes: Positive for photophobia, pain, itching and visual disturbance  Respiratory: Negative  Negative for shortness of breath  Cardiovascular: Negative  Negative for palpitations  Gastrointestinal: Positive for nausea  Negative for vomiting  Endocrine: Negative  Negative for cold intolerance  Genitourinary: Negative  Negative for dysuria, frequency and urgency  Musculoskeletal: Positive for back pain, gait problem, joint swelling, neck pain and neck stiffness  Negative for myalgias  Skin: Negative  Negative for rash  Neurological: Positive for dizziness, tremors, weakness (upper extremities), light-headedness, numbness (upper and lower extremities) and headaches  Negative for seizures, syncope, facial asymmetry and speech difficulty  Hematological: Negative  Does not bruise/bleed easily  Psychiatric/Behavioral: Positive for sleep disturbance  Negative for confusion and hallucinations

## 2021-08-10 NOTE — LETTER
August 10, 2021     Wali Kumar, [address] [facility] [address]    Patient: Grady Bennett   YOB: 1960   Date of Visit: 8/10/2021       Dear Dr Jacobsen Pod: Thank you for referring Grady Bennett to me for evaluation  Below are my notes for this consultation  If you have questions, please do not hesitate to call me  I look forward to following your patient along with you  Sincerely,        Chen Hudson MD        CC: DO Chen Frausto MD  8/10/2021 10:14 AM  Sign when Signing Visit  Grady Bennett is a 62 y o  female Presents with complaints of headaches, neck pain and dizziness    Assessment:  1  Migraine with aura and without status migrainosus, not intractable    2  Cervicogenic headache    3  Occipital neuralgia of right side    4  Benign paroxysmal positional vertigo, left    5  Peripheral neuropathy    6  Paresthesia    7  Sensory ataxia    8  Right cervical radiculopathy        Plan:  Blood work   EMG right upper lower extremity   Physical therapy   Increase Imitrex to 100 mg p r n  headache  Follow-up 2 months    Discussion:   Omayra Ceballos has a long history of headaches with typical vascular features occurring every other month  She has found Imitrex 50 mg does reduce the intensity the headache but does not get rid of it and have recommended increasing the dose to the 100 mg size  In addition she has headaches which are more cervicogenic in nature with findings consistent with greater occipital neuralgia on the right side  She reports that she is currently taking Lyrica but is not on her current medication list   She will check when she gets home if she is indeed taking it and what the dose is and would consider increasing it  She also has findings consistent with right cervical radiculopathy  In addition she demonstrates changes consistent with benign positional vertigo on the left    In addition has sensory changes in the distal lower extremities consistent with peripheral neuropathy which may be contributing to some of her balance issues  Will obtain an EMG of the right upper and lower extremity given radiculopathy and neuropathy signs and will initiate physical therapy to work on vestibular issues balance issues as well as cervical pain  I will see her back in follow-up in 6-8 weeks      Subjective:    HPI   Rere Hill is a right-handed woman who presents with the above complaints  She states that since her late teens she has had migraine headaches  She states her migraine headaches frequently or preceded by of visual disturbance of bright jagged lights in her vision  She states that a throbbing stabbing pressure type headache then ensues lateralized more to the right and across the frontal region  She states that these headaches can sometimes last for a couple of weeks and typically occur her every other month  She states that she was given a prescription for Imitrex 50 mg which she takes which she feels the headache coming on  She states that it does not get rid of the headache but does dull it a but and she will sometimes take a 2nd dose after an hour  In addition she states more recently over the last couple of years she has been getting a stabbing type pain that lateralizes to the posterior head region on the right  It lasts for a couple minutes  She states that at times the pain travels into the right frontal region  She has not noticed anything that seems to precipitate these symptoms  She does have a history of chronic neck pain which she states began when she fell down steps in 2007  She states that she was treated with physical therapy and is currently seeing pain management without significant relief  It was several years ago that she tried physical therapy  She states occasionally the pain radiates into the right arm associated with numbness and tingling in the arm    She states that she was put on gabapentin but this caused GI upset and is currently taking pregabalin but this is not on her medication list   In addition she reports dizziness  She states that when she moves her head side to side she notes a spinning sensation  She also notes when she is on her feet she has more of an off-balance feeling  She states the spinning sensation is been going on for a couple of years  Brain MRI results performed in June of this year are as follows  1   No acute intracranial abnormality  2   Probable arachnoid cyst posterolateral to the left cerebellar hemisphere with mild mass effect of midline shift   Prominence of the extra-axial CSF space along the vertex bilaterally which may be due to atrophy, but the possibility of balanced subdural hygromas which are isointense to CSF are not excluded  3   Few tiny nodular foci of hyperintense FLAIR signal involving white matter, most commonly seen with small-vessel ischemic disease   Poorly visualized vertebrobasilar flow voids which may be congenital or acquired   Low-lying cerebellar tonsils  These are unlikely these are contributing to her above symptoms      Past Medical History:   Diagnosis Date    Abdominal wall fluid collections 8/19/2019    Asthma 8/19/2019    Depression     GERD (gastroesophageal reflux disease) 8/19/2019    Incisional hernia, without obstruction or gangrene 8/19/2019       Family History:  Family History   Problem Relation Age of Onset    Endocrine tumor Maternal Grandmother        Past Surgical History:  Past Surgical History:   Procedure Laterality Date    CHOLECYSTECTOMY      SMALL INTESTINE SURGERY      UMBILICAL HERNIA REPAIR      WOUND DEBRIDEMENT N/A 8/20/2019    Procedure: I&D and DEBRIDEMENT WOUND ABDOMINAL ([facility] OUT); Surgeon: Fabrizio Lang MD;  Location: [facility];  Service: General       Social History:   reports that she has never smoked  She has never used smokeless tobacco  She reports previous alcohol use   She reports that she does not use drugs      Allergies:  Adhesive [medical tape], Ascorbate - food allergy, Latex, Naproxen, Nuts - food allergy, Other, and Risperidone      Current Outpatient Medications:     albuterol (PROVENTIL HFA,VENTOLIN HFA) 90 mcg/act inhaler, Inhale 2 puffs every 6 (six) hours as needed for wheezing or shortness of breath, Disp: , Rfl:     ARIPiprazole (ABILIFY) 15 mg tablet, Take 15 mg by mouth daily, Disp: , Rfl:     buPROPion (WELLBUTRIN XL) 300 mg 24 hr tablet, Take 300 mg by mouth every morning, Disp: , Rfl:     celecoxib (CeleBREX) 200 mg capsule, TAKE 1 CAPSULE EVERY DAY AS NEEDED FOR 30 DAYS , Disp: , Rfl:     diclofenac sodium (VOLTAREN) 1 %, Apply 2 g topically daily as needed, Disp: , Rfl:     fluticasone-salmeterol (ADVAIR DISKUS, WIXELA INHUB) 500-50 mcg/dose inhaler, Inhale 1 puff 2 (two) times a day Rinse mouth after use , Disp: , Rfl:     hydrOXYzine HCL (ATARAX) 50 mg tablet, hydroxyzine HCl 50 mg tablet, Disp: , Rfl:     metoclopramide (REGLAN) 5 mg tablet, Take 5 mg by mouth 3 (three) times a day with meals, Disp: , Rfl:     montelukast (SINGULAIR) 10 mg tablet, Take 10 mg by mouth daily at bedtime, Disp: , Rfl:     omeprazole (PriLOSEC) 40 MG capsule, Take 40 mg by mouth daily, Disp: , Rfl:     polyethylene glycol-propylene glycol (Systane) 0 4-0 3 %, Systane (PF) 0 4 %-0 3 % eye drops in a dropperette, Disp: , Rfl:     SUMAtriptan (IMITREX) 50 mg tablet, sumatriptan 50 mg tablet  1 TABLET AT ONSET OF HEADACHE, MAY REPEAT IN 2 HOURS (MIGRAINE HEADACHE), Disp: , Rfl:     tiZANidine (ZANAFLEX) 4 mg tablet, tizanidine 4 mg tablet, Disp: , Rfl:     traZODone (DESYREL) 100 mg tablet, Take 150 mg by mouth daily at bedtime , Disp: , Rfl:     triamcinolone (KENALOG) 0 1 % cream, APPLY THIN COAT TO AFFECTED AREA TWICE A DAY, Disp: , Rfl:     umeclidinium bromide (INCRUSE ELLIPTA) 62 5 mcg/inh AEPB inhaler, Inhale 1 puff daily, Disp: , Rfl:     busPIRone (BUSPAR) 10 mg tablet, Take 10 mg by mouth 2 (two) times a day (Patient not taking: Reported on 8/10/2021), Disp: , Rfl:     SUMAtriptan (IMITREX) 100 mg tablet, One p o  P r n  Headache, may repeat 1 after 2 hours p r n  Maximum 2/24 hours, Disp: 9 tablet, Rfl: 5    zonisamide (ZONEGRAN) 100 mg capsule, Take 100 mg by mouth daily (Patient not taking: Reported on 7/7/2021), Disp: , Rfl:      I have reviewed the past medical, social and family history, current medications, allergies, vitals, review of systems and updated this information as appropriate today     Objective:    Vitals:  Blood pressure 133/84, pulse 59, temperature 98 1 °F (36 7 °C), temperature source Tympanic, height 5' (1 524 m), weight 119 kg (263 lb)  Physical Exam    Neurological Exam    GENERAL:  Cooperative in no acute distress  Well-developed and well-nourished    HEAD and NECK   Head is atraumatic normocephalic with no lesions or masses  Neck is supple with full range of motion  Tenderness is noted over the lateral cervical regions bilaterally  There is also tenderness over the right greater occipital nerve region    CARDIOVASCULAR  Carotid Arteries-no carotid bruits  NEUROLOGIC:  Mental Status-the patient is awake alert and oriented without aphasia or apraxia  Cranial Nerves: Visual fields are full to confrontation  Extraocular movements are full without nystagmus  Pupils are 2-1/2 mm and reactive  Face is symmetrical to light touch  Movements of facial expression move symmetrically  Hearing is normal to finger rub bilaterally  Soft palate lifts symmetrically  Shoulder shrug is symmetrical  Tongue is midline without atrophy  Motor: No drift is noted on arm extension  Strength is full in the upper and lower extremities with normal bulk and tone  Sensory:  diminished temperature and intact vibratory sensation in the  distal lower extremities bilaterally  Cortical function is intact  Coordination: Finger to nose testing is performed accurately   Romberg is Positive with eyes closed  Gait reveals a normal base with symmetrical arm swing  Tandem walk could not be performed without stepping off  Reflexes: 1 in the right biceps brachioradialis regions and 1+ in the left in the embolic is regions  Trace at the triceps, 1 at the knees and trace at the ankles  Toes are downgoing  Hallpike maneuver was performed and produced vertigo with head down to the right associated with nausea            ROS:    Review of Systems   Constitutional: Positive for appetite change and fatigue  Negative for fever  HENT: Positive for trouble swallowing (feels her throat is narrowing)  Negative for hearing loss, tinnitus and voice change  Eyes: Positive for photophobia, pain, itching and visual disturbance  Respiratory: Negative  Negative for shortness of breath  Cardiovascular: Negative  Negative for palpitations  Gastrointestinal: Positive for nausea  Negative for vomiting  Endocrine: Negative  Negative for cold intolerance  Genitourinary: Negative  Negative for dysuria, frequency and urgency  Musculoskeletal: Positive for back pain, gait problem, joint swelling, neck pain and neck stiffness  Negative for myalgias  Skin: Negative  Negative for rash  Neurological: Positive for dizziness, tremors, weakness (upper extremities), light-headedness, numbness (upper and lower extremities) and headaches  Negative for seizures, syncope, facial asymmetry and speech difficulty  Hematological: Negative  Does not bruise/bleed easily  Psychiatric/Behavioral: Positive for sleep disturbance  Negative for confusion and hallucinations

## 2021-08-10 NOTE — TELEPHONE ENCOUNTER
Left message on patient's voicemail that I received her message that she is taking 75 mg of Lyrica but it is unclear how often she takes that through the day and have requested that she notify me

## 2021-08-17 LAB
ALBUMIN SERPL ELPH-MCNC: 3.6 G/DL (ref 2.9–4.4)
ALBUMIN/GLOB SERPL: 1.4 {RATIO} (ref 0.7–1.7)
ALPHA1 GLOB SERPL ELPH-MCNC: 0.2 G/DL (ref 0–0.4)
ALPHA2 GLOB SERPL ELPH-MCNC: 0.7 G/DL (ref 0.4–1)
ANA SER QL: NEGATIVE
B BURGDOR IGG+IGM SER-ACNC: <0.91 ISR (ref 0–0.9)
B-GLOBULIN SERPL ELPH-MCNC: 1 G/DL (ref 0.7–1.3)
ERYTHROCYTE [SEDIMENTATION RATE] IN BLOOD BY WESTERGREN METHOD: 22 MM/HR (ref 0–40)
FOLATE SERPL-MCNC: 8 NG/ML
GAMMA GLOB SERPL ELPH-MCNC: 0.7 G/DL (ref 0.4–1.8)
GLOBULIN SER CALC-MCNC: 2.6 G/DL (ref 2.2–3.9)
LABORATORY COMMENT REPORT: NORMAL
M PROTEIN SERPL ELPH-MCNC: NORMAL G/DL
PROT SERPL-MCNC: 6.2 G/DL (ref 6–8.5)
VIT B12 SERPL-MCNC: 1165 PG/ML (ref 232–1245)

## 2021-09-10 ENCOUNTER — PROCEDURE VISIT (OUTPATIENT)
Dept: NEUROLOGY | Facility: CLINIC | Age: 61
End: 2021-09-10
Payer: COMMERCIAL

## 2021-09-10 DIAGNOSIS — R20.2 PARESTHESIA: ICD-10-CM

## 2021-09-10 DIAGNOSIS — G62.9 PERIPHERAL NEUROPATHY: ICD-10-CM

## 2021-09-10 DIAGNOSIS — M54.12 RIGHT CERVICAL RADICULOPATHY: ICD-10-CM

## 2021-09-10 PROCEDURE — 95886 MUSC TEST DONE W/N TEST COMP: CPT | Performed by: PSYCHIATRY & NEUROLOGY

## 2021-09-10 PROCEDURE — 95911 NRV CNDJ TEST 9-10 STUDIES: CPT | Performed by: PSYCHIATRY & NEUROLOGY

## 2021-09-10 NOTE — PROGRESS NOTES
EMG 1 Upper/1 Lower Neuropathy     Date/Time 9/10/2021 11:10 AM     Performed by  Mabel Irvin MD     Authorized by Mabel Irvin MD              EMG RIGHT/UPPER/LOWER EXTREMITY    Motor and sensory conduction studies were performed on the right median, ulnar, peroneal, tibial and sural nerves  The distal motor latencies were normal  The motor action potential amplitudes were normal  Motor conduction velocities were normal including conduction velocity of the ulnar nerve across the elbow and peroneal nerve across the fibular head  F waves were normal     The right median, radial, ulnar and sural distal sensory latencies were normal with normal palmar conduction with median stimulation  Action potential amplitudes were normal     H  reflexes were symmetrically normal     Concentric needle EMG was performed in the right APB, FDI, EDC, brachial radialis, biceps, triceps, EDB, tibialis anterior, gastrocnemius medius, vastus lateralis, biceps femoralis short head in the  low cervical and lumbar paraspinal regions  There was no evidence of spontaneous activity seen  The compound motor unit potentials were of normal configuration and interference patterns were full were full for effort  IMPRESSION: This is a normal EMG of the right upper and lower extremity  Small fiber neuropathy and radiculopathy cannot entirely be excluded      OTTONIEL Lopez

## 2021-09-28 ENCOUNTER — EVALUATION (OUTPATIENT)
Dept: PHYSICAL THERAPY | Facility: CLINIC | Age: 61
End: 2021-09-28
Payer: COMMERCIAL

## 2021-09-28 DIAGNOSIS — M54.12 RIGHT CERVICAL RADICULOPATHY: ICD-10-CM

## 2021-09-28 DIAGNOSIS — G44.86 CERVICOGENIC HEADACHE: ICD-10-CM

## 2021-09-28 DIAGNOSIS — M54.81 OCCIPITAL NEURALGIA OF RIGHT SIDE: ICD-10-CM

## 2021-09-28 DIAGNOSIS — R20.2 PARESTHESIA: ICD-10-CM

## 2021-09-28 DIAGNOSIS — H81.12 BENIGN PAROXYSMAL POSITIONAL VERTIGO, LEFT: ICD-10-CM

## 2021-09-28 DIAGNOSIS — R27.8 SENSORY ATAXIA: ICD-10-CM

## 2021-09-28 PROCEDURE — 97161 PT EVAL LOW COMPLEX 20 MIN: CPT | Performed by: PHYSICAL THERAPIST

## 2021-09-28 NOTE — PROGRESS NOTES
PT Evaluation     Today's date: 2021  Patient name: Lee Heart  : 1960  MRN: [id number]  Referring provider: Lia Clarke MD  Dx:   Encounter Diagnosis     ICD-10-CM    1  Cervicogenic headache  R51 9 Ambulatory referral to Physical Therapy   2  Occipital neuralgia of right side  M54 81 Ambulatory referral to Physical Therapy   3  Benign paroxysmal positional vertigo, left  H81 12 Ambulatory referral to Physical Therapy   4  Peripheral neuropathy  G62 9 Ambulatory referral to Physical Therapy   5  Paresthesia  R20 2 Ambulatory referral to Physical Therapy   6  Sensory ataxia  R27 8 Ambulatory referral to Physical Therapy   7  Right cervical radiculopathy  M54 12 Ambulatory referral to Physical Therapy       Start Time: 930  Stop Time: 1015  Total time in clinic (min): 45 minutes    Assessment  Assessment details: Patient presents with chronic neck pain, recent neuropathy, demonstrates FHRS, (+) BL UT myofascial tightness, decreased Rom and Strength, reports difficulty turning head, reaching overhead, sitting for prolonged periods; patient would benefit from therapeutic exercise, manual therapy, patient education regarding posture and joint protection, therapeutic activities; patient issued HEP  Impairments: abnormal or restricted ROM, impaired physical strength and pain with function  Understanding of Dx/Px/POC: good   Prognosis: good    Goals  STGs  1  Decrease pain 50% in 2 weeks  2  Increase Rom 50% in 2 weeks  3  Increase Strength half grade in 2 weeks  4  Compliant with HEP in 2 weeks  LTGs  1  Decrease pain to mild to none in 4 weeks  2  Increase Rom WNL in 4 weeks  3  Increase Strength WNL in 4 weeks  4  Able to turn head with mild to no difficulty in 4 weeks  5  Able to reach overhead with mild to no difficulty in 4 weeks  6   Able to sit for prolonged periods of time with mild to no difficulty in 4 weeks    Plan  Patient would benefit from: skilled physical therapy  Planned modality interventions: hydrotherapy  Planned therapy interventions: manual therapy, neuromuscular re-education, patient education, postural training, therapeutic exercise, therapeutic activities, home exercise program and functional ROM exercises  Frequency: 2x week  Duration in weeks: 4        Subjective Evaluation    History of Present Illness  Mechanism of injury: Chronic, neuropathy recent          Recurrent probem    Quality of life: good    Pain  Current pain ratin  At best pain ratin  At worst pain rating: 10  Quality: radiating, dull ache and sharp  Relieving factors: relaxation and medications  Aggravating factors: overhead activity and sitting    Social Support  Lives with: adult children    Employment status: not working  Hand dominance: right      Diagnostic Tests  MRI studies: abnormal  Treatments  Current treatment: medication  Patient Goals  Patient goals for therapy: increased motion, decreased pain and increased strength  Patient goal: turn head, reach overhead, sit for prolonged periods of time        Objective     Postural Observations    Additional Postural Observation Details  FHRS    Palpation   Left   Hypertonic in the upper trapezius  Right   Hypertonic in the upper trapezius       Active Range of Motion   Cervical/Thoracic Spine       Cervical    Flexion:  with pain Restriction level: moderate  Extension:  with pain Restriction level: moderate  Left rotation:  Restriction level: moderate  Right rotation:  Restriction level: moderate  Left Shoulder   Flexion: 100 degrees   Extension: 20 degrees   Abduction: 80 degrees   External rotation 0°: 40 degrees   Internal rotation BTB: T12     Right Shoulder   Flexion: 100 degrees   Extension: 20 degrees   Abduction: 80 degrees   External rotation 0°: 40 degrees   Internal rotation BTB: T12     Strength/Myotome Testing     Left Shoulder     Planes of Motion   Flexion: 3+   Extension: 3+   Abduction: 3+   External rotation at 0°: 3+   Internal rotation at 0°: 4-     Right Shoulder     Planes of Motion   Flexion: 3+   Extension: 3+   Abduction: 3+   External rotation at 0°: 3+   Internal rotation at 0°: 4-              Precautions: Chiari malformation      Manual 9/28            MFR             vib mass                                       Neuro Re-Ed             scap retract             kira 3-way                                                                              Ther Ex             pulleys             Prom (gentle)             grippers             HEP 15                                                                Ther Activity             reach                          Gait Training                                       Modalities

## 2021-10-18 ENCOUNTER — TELEPHONE (OUTPATIENT)
Dept: NEUROLOGY | Facility: CLINIC | Age: 61
End: 2021-10-18

## 2021-10-19 ENCOUNTER — OFFICE VISIT (OUTPATIENT)
Dept: PHYSICAL THERAPY | Facility: CLINIC | Age: 61
End: 2021-10-19
Payer: COMMERCIAL

## 2021-10-19 ENCOUNTER — APPOINTMENT (OUTPATIENT)
Dept: PHYSICAL THERAPY | Facility: CLINIC | Age: 61
End: 2021-10-19
Payer: COMMERCIAL

## 2021-10-19 DIAGNOSIS — H81.12 BENIGN PAROXYSMAL POSITIONAL VERTIGO, LEFT: ICD-10-CM

## 2021-10-19 DIAGNOSIS — R27.8 SENSORY ATAXIA: ICD-10-CM

## 2021-10-19 DIAGNOSIS — R20.2 PARESTHESIA: ICD-10-CM

## 2021-10-19 DIAGNOSIS — M54.12 RIGHT CERVICAL RADICULOPATHY: ICD-10-CM

## 2021-10-19 DIAGNOSIS — M54.81 OCCIPITAL NEURALGIA OF RIGHT SIDE: ICD-10-CM

## 2021-10-19 DIAGNOSIS — G44.86 CERVICOGENIC HEADACHE: Primary | ICD-10-CM

## 2021-10-19 PROCEDURE — 97140 MANUAL THERAPY 1/> REGIONS: CPT | Performed by: PHYSICAL THERAPIST

## 2021-10-19 PROCEDURE — 97112 NEUROMUSCULAR REEDUCATION: CPT | Performed by: PHYSICAL THERAPIST

## 2021-10-19 PROCEDURE — 97110 THERAPEUTIC EXERCISES: CPT | Performed by: PHYSICAL THERAPIST

## 2021-10-21 ENCOUNTER — OFFICE VISIT (OUTPATIENT)
Dept: PHYSICAL THERAPY | Facility: CLINIC | Age: 61
End: 2021-10-21
Payer: COMMERCIAL

## 2021-10-21 DIAGNOSIS — M54.81 OCCIPITAL NEURALGIA OF RIGHT SIDE: ICD-10-CM

## 2021-10-21 DIAGNOSIS — H81.12 BENIGN PAROXYSMAL POSITIONAL VERTIGO, LEFT: ICD-10-CM

## 2021-10-21 DIAGNOSIS — R20.2 PARESTHESIA: ICD-10-CM

## 2021-10-21 DIAGNOSIS — M54.12 RIGHT CERVICAL RADICULOPATHY: ICD-10-CM

## 2021-10-21 DIAGNOSIS — G44.86 CERVICOGENIC HEADACHE: Primary | ICD-10-CM

## 2021-10-21 DIAGNOSIS — R27.8 SENSORY ATAXIA: ICD-10-CM

## 2021-10-21 PROCEDURE — 97110 THERAPEUTIC EXERCISES: CPT | Performed by: PHYSICAL THERAPIST

## 2021-10-21 PROCEDURE — 97140 MANUAL THERAPY 1/> REGIONS: CPT | Performed by: PHYSICAL THERAPIST

## 2021-10-21 PROCEDURE — 97112 NEUROMUSCULAR REEDUCATION: CPT | Performed by: PHYSICAL THERAPIST

## 2021-10-26 ENCOUNTER — OFFICE VISIT (OUTPATIENT)
Dept: PHYSICAL THERAPY | Facility: CLINIC | Age: 61
End: 2021-10-26
Payer: COMMERCIAL

## 2021-10-26 DIAGNOSIS — M54.12 RIGHT CERVICAL RADICULOPATHY: ICD-10-CM

## 2021-10-26 DIAGNOSIS — H81.12 BENIGN PAROXYSMAL POSITIONAL VERTIGO, LEFT: ICD-10-CM

## 2021-10-26 DIAGNOSIS — G44.86 CERVICOGENIC HEADACHE: Primary | ICD-10-CM

## 2021-10-26 DIAGNOSIS — R27.8 SENSORY ATAXIA: ICD-10-CM

## 2021-10-26 DIAGNOSIS — R20.2 PARESTHESIA: ICD-10-CM

## 2021-10-26 DIAGNOSIS — M54.81 OCCIPITAL NEURALGIA OF RIGHT SIDE: ICD-10-CM

## 2021-10-26 PROCEDURE — 97112 NEUROMUSCULAR REEDUCATION: CPT | Performed by: PHYSICAL THERAPIST

## 2021-10-26 PROCEDURE — 97110 THERAPEUTIC EXERCISES: CPT | Performed by: PHYSICAL THERAPIST

## 2021-10-26 PROCEDURE — 97140 MANUAL THERAPY 1/> REGIONS: CPT | Performed by: PHYSICAL THERAPIST

## 2021-10-28 ENCOUNTER — OFFICE VISIT (OUTPATIENT)
Dept: PHYSICAL THERAPY | Facility: CLINIC | Age: 61
End: 2021-10-28
Payer: COMMERCIAL

## 2021-10-28 DIAGNOSIS — M54.12 RIGHT CERVICAL RADICULOPATHY: ICD-10-CM

## 2021-10-28 DIAGNOSIS — M54.81 OCCIPITAL NEURALGIA OF RIGHT SIDE: ICD-10-CM

## 2021-10-28 DIAGNOSIS — G44.86 CERVICOGENIC HEADACHE: Primary | ICD-10-CM

## 2021-10-28 DIAGNOSIS — R27.8 SENSORY ATAXIA: ICD-10-CM

## 2021-10-28 DIAGNOSIS — H81.12 BENIGN PAROXYSMAL POSITIONAL VERTIGO, LEFT: ICD-10-CM

## 2021-10-28 DIAGNOSIS — R20.2 PARESTHESIA: ICD-10-CM

## 2021-10-28 PROCEDURE — 97112 NEUROMUSCULAR REEDUCATION: CPT | Performed by: PHYSICAL THERAPIST

## 2021-10-28 PROCEDURE — 97140 MANUAL THERAPY 1/> REGIONS: CPT | Performed by: PHYSICAL THERAPIST

## 2021-10-28 PROCEDURE — 97110 THERAPEUTIC EXERCISES: CPT | Performed by: PHYSICAL THERAPIST

## 2021-11-02 ENCOUNTER — OFFICE VISIT (OUTPATIENT)
Dept: PHYSICAL THERAPY | Facility: CLINIC | Age: 61
End: 2021-11-02
Payer: COMMERCIAL

## 2021-11-02 DIAGNOSIS — R27.8 SENSORY ATAXIA: ICD-10-CM

## 2021-11-02 DIAGNOSIS — H81.12 BENIGN PAROXYSMAL POSITIONAL VERTIGO, LEFT: ICD-10-CM

## 2021-11-02 DIAGNOSIS — M54.12 RIGHT CERVICAL RADICULOPATHY: ICD-10-CM

## 2021-11-02 DIAGNOSIS — M54.81 OCCIPITAL NEURALGIA OF RIGHT SIDE: ICD-10-CM

## 2021-11-02 DIAGNOSIS — G44.86 CERVICOGENIC HEADACHE: Primary | ICD-10-CM

## 2021-11-02 DIAGNOSIS — R20.2 PARESTHESIA: ICD-10-CM

## 2021-11-02 PROCEDURE — 97112 NEUROMUSCULAR REEDUCATION: CPT | Performed by: PHYSICAL THERAPIST

## 2021-11-02 PROCEDURE — 97110 THERAPEUTIC EXERCISES: CPT | Performed by: PHYSICAL THERAPIST

## 2021-11-02 PROCEDURE — 97140 MANUAL THERAPY 1/> REGIONS: CPT | Performed by: PHYSICAL THERAPIST

## 2021-11-03 ENCOUNTER — OFFICE VISIT (OUTPATIENT)
Dept: NEUROLOGY | Facility: CLINIC | Age: 61
End: 2021-11-03
Payer: COMMERCIAL

## 2021-11-03 VITALS
HEIGHT: 60 IN | WEIGHT: 260 LBS | SYSTOLIC BLOOD PRESSURE: 136 MMHG | HEART RATE: 83 BPM | TEMPERATURE: 97.4 F | BODY MASS INDEX: 51.04 KG/M2 | DIASTOLIC BLOOD PRESSURE: 82 MMHG

## 2021-11-03 DIAGNOSIS — M54.81 OCCIPITAL NEURALGIA OF RIGHT SIDE: ICD-10-CM

## 2021-11-03 DIAGNOSIS — M54.12 RIGHT CERVICAL RADICULOPATHY: ICD-10-CM

## 2021-11-03 DIAGNOSIS — G44.86 CERVICOGENIC HEADACHE: ICD-10-CM

## 2021-11-03 DIAGNOSIS — G62.9 PERIPHERAL NEUROPATHY: ICD-10-CM

## 2021-11-03 DIAGNOSIS — G43.109 MIGRAINE WITH AURA AND WITHOUT STATUS MIGRAINOSUS, NOT INTRACTABLE: Primary | ICD-10-CM

## 2021-11-03 PROCEDURE — 99214 OFFICE O/P EST MOD 30 MIN: CPT | Performed by: PSYCHIATRY & NEUROLOGY

## 2021-11-03 RX ORDER — MECLIZINE HCL 12.5 MG/1
12.5 TABLET ORAL
COMMUNITY

## 2021-11-04 ENCOUNTER — APPOINTMENT (OUTPATIENT)
Dept: PHYSICAL THERAPY | Facility: CLINIC | Age: 61
End: 2021-11-04
Payer: COMMERCIAL

## 2021-11-09 ENCOUNTER — OFFICE VISIT (OUTPATIENT)
Dept: PHYSICAL THERAPY | Facility: CLINIC | Age: 61
End: 2021-11-09
Payer: COMMERCIAL

## 2021-11-09 DIAGNOSIS — R20.2 PARESTHESIA: ICD-10-CM

## 2021-11-09 DIAGNOSIS — H81.12 BENIGN PAROXYSMAL POSITIONAL VERTIGO, LEFT: ICD-10-CM

## 2021-11-09 DIAGNOSIS — M54.12 RIGHT CERVICAL RADICULOPATHY: ICD-10-CM

## 2021-11-09 DIAGNOSIS — R27.8 SENSORY ATAXIA: ICD-10-CM

## 2021-11-09 DIAGNOSIS — M54.81 OCCIPITAL NEURALGIA OF RIGHT SIDE: ICD-10-CM

## 2021-11-09 DIAGNOSIS — G44.86 CERVICOGENIC HEADACHE: Primary | ICD-10-CM

## 2021-11-09 PROCEDURE — 97112 NEUROMUSCULAR REEDUCATION: CPT | Performed by: PHYSICAL THERAPIST

## 2021-11-09 PROCEDURE — 97140 MANUAL THERAPY 1/> REGIONS: CPT | Performed by: PHYSICAL THERAPIST

## 2021-11-09 PROCEDURE — 97110 THERAPEUTIC EXERCISES: CPT | Performed by: PHYSICAL THERAPIST

## 2021-11-11 ENCOUNTER — OFFICE VISIT (OUTPATIENT)
Dept: PHYSICAL THERAPY | Facility: CLINIC | Age: 61
End: 2021-11-11
Payer: COMMERCIAL

## 2021-11-11 DIAGNOSIS — M54.12 RIGHT CERVICAL RADICULOPATHY: ICD-10-CM

## 2021-11-11 DIAGNOSIS — R27.8 SENSORY ATAXIA: ICD-10-CM

## 2021-11-11 DIAGNOSIS — R20.2 PARESTHESIA: ICD-10-CM

## 2021-11-11 DIAGNOSIS — G44.86 CERVICOGENIC HEADACHE: Primary | ICD-10-CM

## 2021-11-11 DIAGNOSIS — M54.81 OCCIPITAL NEURALGIA OF RIGHT SIDE: ICD-10-CM

## 2021-11-11 DIAGNOSIS — H81.12 BENIGN PAROXYSMAL POSITIONAL VERTIGO, LEFT: ICD-10-CM

## 2021-11-11 PROCEDURE — 97140 MANUAL THERAPY 1/> REGIONS: CPT | Performed by: PHYSICAL THERAPIST

## 2021-11-11 PROCEDURE — 97112 NEUROMUSCULAR REEDUCATION: CPT | Performed by: PHYSICAL THERAPIST

## 2021-11-11 PROCEDURE — 97110 THERAPEUTIC EXERCISES: CPT | Performed by: PHYSICAL THERAPIST

## 2022-02-01 ENCOUNTER — TELEPHONE (OUTPATIENT)
Dept: NEUROLOGY | Facility: CLINIC | Age: 62
End: 2022-02-01

## 2022-02-01 NOTE — TELEPHONE ENCOUNTER
Mazin Barragan called back and confirmed her 2/4/22 12pm appointment with Dr Quinn Montoya  She was inquiring if the appointment can be changed to virtual due the incoming bad weather  I advise we will call her back once we check with the provider

## 2022-02-04 ENCOUNTER — TELEMEDICINE (OUTPATIENT)
Dept: NEUROLOGY | Facility: CLINIC | Age: 62
End: 2022-02-04
Payer: COMMERCIAL

## 2022-02-04 DIAGNOSIS — G43.109 MIGRAINE WITH AURA, NOT INTRACTABLE, WITHOUT STATUS MIGRAINOSUS: ICD-10-CM

## 2022-02-04 DIAGNOSIS — M54.81 OCCIPITAL NEURALGIA: ICD-10-CM

## 2022-02-04 DIAGNOSIS — G62.9 POLYNEUROPATHY, UNSPECIFIED: ICD-10-CM

## 2022-02-04 DIAGNOSIS — G44.86 CERVICOGENIC HEADACHE: ICD-10-CM

## 2022-02-04 PROCEDURE — 99213 OFFICE O/P EST LOW 20 MIN: CPT | Performed by: PSYCHIATRY & NEUROLOGY

## 2022-02-04 RX ORDER — MELOXICAM 15 MG/1
15 TABLET ORAL DAILY
COMMUNITY

## 2022-02-04 RX ORDER — ALPHA LIPOIC ACID 300 MG
300 CAPSULE ORAL DAILY
COMMUNITY

## 2022-02-04 RX ORDER — PREGABALIN 75 MG/1
75 CAPSULE ORAL 3 TIMES DAILY
COMMUNITY
Start: 2022-01-04

## 2022-02-04 NOTE — PROGRESS NOTES
Virtual Regular Visit    Verification of patient location:    Patient is located in the following state in which I hold an active license [place]:  Continue Imitrex as needed   Continue home exercises   Follow-up 6 months    Discussion:  Kate Desai reports her headache frequency has remained stable at 1 or 2 a month and finds that Imitrex remains effective in stopping her headache when she gets it  Her neck pain symptoms are manageable and she is continue home exercises  She continues to awaken with some dizziness in the morning and takes meclizine at nighttime to manage this  No issues with her neuropathy  She has been having some issues with her jaw and if this persists she will talk with her dentist   I will see her back in follow-up in 6 months    Problem List Items Addressed This Visit     None      Visit Diagnoses     Migraine with aura, not intractable, without status migrainosus        Cervicogenic headache        Occipital neuralgia        Polyneuropathy, unspecified                   Reason for visit is   Chief Complaint   Patient presents with    Migraine    Virtual Regular Visit        Encounter provider Simi De La aGrza MD    Provider located at [address] [address]      Recent Visits  Date Type Provider Dept   02/01/22 Telephone Simi De La Garza MD [facility]   02/01/22 Telephone [facility] recent visits within past 7 days and meeting all other requirements  Today's Visits  Date Type Provider Dept   02/04/22 Telemedicine Simi De La Garza  [facility] [address] today's visits and meeting all other requirements  Future Appointments  No visits were found meeting these conditions    Showing future appointments within next 150 days and meeting all other requirements       The patient was identified by name and date of birth  Karen Heart was informed that this is a telemedicine visit and that the visit is being conducted through telephone conferencing as patient was not able to connect video via baseclick in lieu of office visit in the face of ongoing Covid19 viral epidemic  Patient understands that this format is not completely HIPPA compliant, however I am sitting in my office alone with the door closed and patient is in a comfortable environment to speak with me about current health issues  She acknowledged consent and understanding of privacy and security of the video platform  The patient has agreed to participate and understands they can discontinue the visit at any time  Patient is aware this is a billable service  Subjective  Karen Heart is a 62 y o  female reports overall things have been stable since she was here last   She states she continues to get headaches about once or twice a month and continues to report that Imitrex is effective in stopping the headache when she gets it  She tolerates the medication well  She states she still awakens sometimes with dizziness in the morning and has been taking meclizine at nighttime which seems to be helpful  She has continued her exercises both for her vertigo as well as for her neck pain and states that things seem to be managing  She does occasionally notes some pain in her neck  Recently she has noted some pain in her jaw with opening closing her mouth  She states is not as bad now  She will try Tylenol or Aleve and if this is not effective she will check with her dentist to rule out TMJ issues  No issues with her neuropathy  Nati GUTIERREZ     Past Medical History:   Diagnosis Date    Abdominal wall fluid collections 8/19/2019    Asthma 8/19/2019    Depression     GERD (gastroesophageal reflux disease) 8/19/2019    Incisional hernia, without obstruction or gangrene 8/19/2019       Past Surgical History:   Procedure Laterality Date    CHOLECYSTECTOMY      SMALL INTESTINE SURGERY      UMBILICAL HERNIA REPAIR      WOUND DEBRIDEMENT N/A 8/20/2019    Procedure: I&D and DEBRIDEMENT WOUND ABDOMINAL ([address] OUT); Surgeon: Richard Tucker MD;  Location: [facility];  Service: General       Current Outpatient Medications   Medication Sig Dispense Refill    albuterol (PROVENTIL HFA,VENTOLIN HFA) 90 mcg/act inhaler Inhale 2 puffs every 6 (six) hours as needed for wheezing or shortness of breath      ARIPiprazole (ABILIFY) 15 mg tablet Take 15 mg by mouth daily      buPROPion (WELLBUTRIN XL) 300 mg 24 hr tablet Take 300 mg by mouth every morning      busPIRone (BUSPAR) 10 mg tablet Take 10 mg by mouth 2 (two) times a day       celecoxib (CeleBREX) 200 mg capsule TAKE 1 CAPSULE EVERY DAY AS NEEDED FOR 30 DAYS   diclofenac sodium (VOLTAREN) 1 % Apply 2 g topically daily as needed      fluticasone-salmeterol (ADVAIR DISKUS, WIXELA INHUB) 500-50 mcg/dose inhaler Inhale 1 puff 2 (two) times a day Rinse mouth after use   hydrOXYzine HCL (ATARAX) 50 mg tablet hydroxyzine HCl 50 mg tablet (Patient not taking: Reported on 11/3/2021)      meclizine (ANTIVERT) 12 5 MG tablet meclizine 12 5 mg tablet      metoclopramide (REGLAN) 5 mg tablet Take 5 mg by mouth 3 (three) times a day with meals      montelukast (SINGULAIR) 10 mg tablet Take 10 mg by mouth daily at bedtime      omeprazole (PriLOSEC) 40 MG capsule Take 40 mg by mouth daily      polyethylene glycol-propylene glycol (Systane) 0 4-0 3 % Systane (PF) 0 4 %-0 3 % eye drops in a dropperette      SUMAtriptan (IMITREX) 100 mg tablet One p o  P r n  Headache, may repeat 1 after 2 hours p r n   Maximum 2/24 hours 9 tablet 5    SUMAtriptan (IMITREX) 50 mg tablet sumatriptan 50 mg tablet   1 TABLET AT ONSET OF HEADACHE, MAY REPEAT IN 2 HOURS (MIGRAINE HEADACHE)      tiZANidine (ZANAFLEX) 4 mg tablet tizanidine 4 mg tablet      traZODone (DESYREL) 100 mg tablet Take 150 mg by mouth daily at bedtime       triamcinolone (KENALOG) 0 1 % cream APPLY THIN COAT TO AFFECTED AREA TWICE A DAY      umeclidinium bromide (INCRUSE ELLIPTA) 62 5 mcg/inh AEPB inhaler Inhale 1 puff daily      zonisamide (ZONEGRAN) 100 mg capsule Take 100 mg by mouth daily (Patient not taking: Reported on 7/7/2021)       No current facility-administered medications for this visit  Allergies   Allergen Reactions    Adhesive [Medical Tape] Itching     Pt reports "it breaks down the fibers of my skin over a long period of time, and I itch, breakout, and get bumps"    Ascorbate - Food Allergy Swelling     Pineapple, mangoes, papaya  Pineapple, mangoes, papaya      Latex      Pt reports feeling "hot and flushed, double vision, shortness of breath"    Naproxen Myalgia, Other (See Comments) and Abdominal Pain     Pains of stomach flusnessof body, shortness of breath  Hot, cramps:Gi upset, Itchy and a burning sensation   Pains of stomach flusnessof body, shortness of breath      Nuts - Food Allergy     Other Itching     [place] Jelly    Risperidone Abdominal Pain, Other (See Comments) and GI Intolerance     Shortness of breath,nightmares,numbness of hands, blurred vision  Unsteady gait  Sharp pains in abdomen, Hot, itchy   Shortness of breath,nightmares,numbness of hands, blurred vision  Unsteady gait         Review of Systems   Constitutional: Negative  HENT: Negative  Eyes: Negative  Respiratory: Negative  Cardiovascular: Negative  Gastrointestinal: Negative  Endocrine: Negative  Genitourinary: Negative  Musculoskeletal: Negative  Skin: Negative  Allergic/Immunologic: Negative  Neurological: Positive for headaches  Facial Pain/Tightness (Jaw)   Hematological: Negative  Psychiatric/Behavioral: Negative  I spent 4 minutes directly with the patient during this visit    VIRTUAL VISIT DISCLAIMER      Dorcas Ana verbally agrees to participate in [employer]   Pt is aware that [employer] could be limited without vital signs or the ability to perform a full hands-on physical Lisa Rubiot understands she or the provider may request at any time to terminate the video visit and request the patient to seek care or treatment in person

## 2022-04-12 ENCOUNTER — EVALUATION (OUTPATIENT)
Dept: PHYSICAL THERAPY | Facility: CLINIC | Age: 62
End: 2022-04-12
Payer: COMMERCIAL

## 2022-04-12 DIAGNOSIS — G89.29 CHRONIC BILATERAL LOW BACK PAIN WITH BILATERAL SCIATICA: Primary | ICD-10-CM

## 2022-04-12 DIAGNOSIS — M54.41 CHRONIC BILATERAL LOW BACK PAIN WITH BILATERAL SCIATICA: Primary | ICD-10-CM

## 2022-04-12 DIAGNOSIS — M54.42 CHRONIC BILATERAL LOW BACK PAIN WITH BILATERAL SCIATICA: Primary | ICD-10-CM

## 2022-04-12 PROCEDURE — 97161 PT EVAL LOW COMPLEX 20 MIN: CPT | Performed by: PHYSICAL THERAPIST

## 2022-04-12 PROCEDURE — 97140 MANUAL THERAPY 1/> REGIONS: CPT | Performed by: PHYSICAL THERAPIST
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
## 2022-04-21 ENCOUNTER — OFFICE VISIT (OUTPATIENT)
Dept: PHYSICAL THERAPY | Facility: CLINIC | Age: 62
End: 2022-04-21
Payer: COMMERCIAL

## 2022-04-21 DIAGNOSIS — M54.42 CHRONIC BILATERAL LOW BACK PAIN WITH BILATERAL SCIATICA: Primary | ICD-10-CM

## 2022-04-21 DIAGNOSIS — G89.29 CHRONIC BILATERAL LOW BACK PAIN WITH BILATERAL SCIATICA: Primary | ICD-10-CM

## 2022-04-21 DIAGNOSIS — M54.41 CHRONIC BILATERAL LOW BACK PAIN WITH BILATERAL SCIATICA: Primary | ICD-10-CM

## 2022-04-21 PROCEDURE — 97140 MANUAL THERAPY 1/> REGIONS: CPT | Performed by: PHYSICAL THERAPIST

## 2022-04-21 PROCEDURE — 97112 NEUROMUSCULAR REEDUCATION: CPT | Performed by: PHYSICAL THERAPIST

## 2022-04-21 NOTE — PROGRESS NOTES
Daily Note     Today's date: 2022  Patient name: Nicanor Ornelas  : 1960  MRN: [id number]  Referring provider: Mita Shell MD  Dx:   Encounter Diagnosis     ICD-10-CM    1  Chronic bilateral low back pain with bilateral sciatica  M54 42     M54 41     G89 29        Start Time: 1145  Stop Time: 1225  Total time in clinic (min): 40 minutes    Subjective: Pt reports pain is about 6-7/10 but felt good after her initial evaluation  Objective: See treatment diary below      Assessment: Pt continues to respond favorably with long axis hip distraction with reduction of symptoms reported post manual tx  Pt remains with limited tolerance lying supine so completed majority of exercises seated in chair to improve tolerance  Introduced abdominal bracing and hip isometrics seated with overall good tolerance but initial cues for correction of mm activation with cues for proper exercise form/sequencing with most difficulty properly completing posterior pelvic tilt maneuver and requires extensive cues  Pt reports mild decrease in pain post tx and encouraged to continue with home exercises daily as symptoms  Will continue to progress next visit as symptoms allow  Plan: Continue per plan of care  Progress treatment as tolerated         Diagnosis: LBP   Precautions: Asthma, chronic low back pain, poor tolerance with prolonged positioning   POC Expires: 22   Re-evaluation Date: 5/10/22   FOTO Scores/Date: Goal - 43; 22 -    Visit Count 1/10 2/10      Manuals       Long axis hip distraction  KD KD                                      Ther Ex        LTR NV 10x3"      physioball roll out  10x5" ea                                                              Neuro Re-Ed        TA isometric - seated NV 10x10"      PPT - seated NV 10x10"      Abd bracing w/ hip add squeeze - seated NV 10x10"      Abd bracing w/ hip abd - seated NV 10x10"      Abd bracing w/ hip march in seated  10x Ther Act                                         Modalities

## 2022-04-26 ENCOUNTER — OFFICE VISIT (OUTPATIENT)
Dept: PHYSICAL THERAPY | Facility: CLINIC | Age: 62
End: 2022-04-26
Payer: COMMERCIAL

## 2022-04-26 DIAGNOSIS — M54.42 CHRONIC BILATERAL LOW BACK PAIN WITH BILATERAL SCIATICA: Primary | ICD-10-CM

## 2022-04-26 DIAGNOSIS — M54.41 CHRONIC BILATERAL LOW BACK PAIN WITH BILATERAL SCIATICA: Primary | ICD-10-CM

## 2022-04-26 DIAGNOSIS — G89.29 CHRONIC BILATERAL LOW BACK PAIN WITH BILATERAL SCIATICA: Primary | ICD-10-CM

## 2022-04-26 PROCEDURE — 97112 NEUROMUSCULAR REEDUCATION: CPT

## 2022-04-26 PROCEDURE — 97110 THERAPEUTIC EXERCISES: CPT

## 2022-04-26 NOTE — PROGRESS NOTES
Daily Note     Today's date: 2022  Patient name: Weston Severs  : 1960  MRN: [id number]  Referring provider: Odalys Jernigan MD  Dx:   Encounter Diagnosis     ICD-10-CM    1  Chronic bilateral low back pain with bilateral sciatica  M54 42     M54 41     G89 29                   Subjective: Patient states she had muscle and joint pain after her LV  reports her pain is 6/10, states she cannot lay down for long periods of time  Objective: See treatment diary below      Assessment: Tolerated treatment well  No progression today due to allow adaptation to exercises, assess patient response NV  Lumbar roll for positioning during treatment, as well as education on positioning with towel when seated  Mod cuing required for PPT, with visual and self tactile cue patient was better able to achieve activation  Patient educated on DOMS and exercise prescription  Patient demonstrated fatigue post treatment and would benefit from continued PT       Plan: Continue per plan of care        Diagnosis: LBP   Precautions: Asthma, chronic low back pain, poor tolerance with prolonged positioning   POC Expires: 22   Re-evaluation Date: 5/10/22   FOTO Scores/Date: Goal - 43; 22 -    Visit Count 1/10 2/10 3/10     Manuals      Long axis hip distraction  KD KD held                                     Ther Ex        LTR NV 10x3"      physioball roll out  10x5" ea 12x 5"   L/R/C  +1x5x ea way at end                                                             Neuro Re-Ed       TA isometric - seated NV 10x10" 10x 10"     PPT - seated NV 10x10" 10x 10" w/ half roll at L/S     Abd bracing w/ hip add squeeze - seated NV 10x10" 10"x10     Abd bracing w/ hip abd - seated NV 10x10" 10"x10      Abd bracing w/ hip march in seated  10x 10x B                                                                            Ther Act                                         Modalities

## 2022-04-28 ENCOUNTER — OFFICE VISIT (OUTPATIENT)
Dept: PHYSICAL THERAPY | Facility: CLINIC | Age: 62
End: 2022-04-28
Payer: COMMERCIAL

## 2022-04-28 DIAGNOSIS — G89.29 CHRONIC BILATERAL LOW BACK PAIN WITH BILATERAL SCIATICA: Primary | ICD-10-CM

## 2022-04-28 DIAGNOSIS — M54.42 CHRONIC BILATERAL LOW BACK PAIN WITH BILATERAL SCIATICA: Primary | ICD-10-CM

## 2022-04-28 DIAGNOSIS — M54.41 CHRONIC BILATERAL LOW BACK PAIN WITH BILATERAL SCIATICA: Primary | ICD-10-CM

## 2022-04-28 PROCEDURE — 97110 THERAPEUTIC EXERCISES: CPT

## 2022-04-28 PROCEDURE — 97112 NEUROMUSCULAR REEDUCATION: CPT

## 2022-04-28 NOTE — PROGRESS NOTES
Daily Note     Today's date: 2022  Patient name: Farzana Palma  : 1960  MRN: [id number]  Referring provider: Omaira Medina MD  Dx:   Encounter Diagnosis     ICD-10-CM    1  Chronic bilateral low back pain with bilateral sciatica  M54 42     M54 41     G89 29        Start Time: 1016  Stop Time: 1057  Total time in clinic (min): 41 minutes    Subjective: patient reports feeling "so-so"  Reports normals soreness post last therapy session without associated pain    Objective: See treatment diary below      Assessment: patient was able to complete and progress in therapy without incident or increase of baseline  Requires cueing throughout on exercise sequencing, mechanics, and rep/hold counts  Initial cueing on corrective abdominal bracing along with holding during LE movements  Was able to introduce SLR with abdominal brace  Normal rest required due to fatigue      Plan: Continue per plan of care  Progress treatment as tolerated         Diagnosis: LBP   Precautions: Asthma, chronic low back pain, poor tolerance with prolonged positioning   POC Expires: 22   Re-evaluation Date: 5/10/22   FOTO Scores/Date: Goal - 37; 22 -    Visit Count 1/10 2/10 3/10 4/10    Manuals     Long axis hip distraction  KD KD held                                     Ther Ex      LTR NV 10x3"  3"x10 ea    physioball roll out  10x5" ea 12x 5"   L/R/C  +1x5x ea way at end 3 way 5"x10 ea     Bike                                                        Neuro Re-Ed      TA isometric - seated NV 10x10" 10x 10" 10x10    PPT - seated NV 10x10" 10x 10" w/ half roll at L/S 10"x10 w/half roll @ lumbar    Abd bracing w/ hip add squeeze - seated NV 10x10" 10"x10 10"x10    Abd bracing w/ hip abd - seated NV 10x10" 10"x10  RTB 10"x10    Abd bracing w/ hip march in seated  10x 10x B x10 ea    SLR w/ab bracing    x10 ea Ther Act                                         Modalities

## 2022-05-03 ENCOUNTER — OFFICE VISIT (OUTPATIENT)
Dept: PHYSICAL THERAPY | Facility: CLINIC | Age: 62
End: 2022-05-03
Payer: COMMERCIAL

## 2022-05-03 DIAGNOSIS — M54.41 CHRONIC BILATERAL LOW BACK PAIN WITH BILATERAL SCIATICA: Primary | ICD-10-CM

## 2022-05-03 DIAGNOSIS — G89.29 CHRONIC BILATERAL LOW BACK PAIN WITH BILATERAL SCIATICA: Primary | ICD-10-CM

## 2022-05-03 DIAGNOSIS — M54.42 CHRONIC BILATERAL LOW BACK PAIN WITH BILATERAL SCIATICA: Primary | ICD-10-CM

## 2022-05-03 PROCEDURE — 97110 THERAPEUTIC EXERCISES: CPT

## 2022-05-03 PROCEDURE — 97112 NEUROMUSCULAR REEDUCATION: CPT

## 2022-05-03 NOTE — PROGRESS NOTES
Daily Note     Today's date: 5/3/2022  Patient name: Weston Severs  : 1960  MRN: [id number]  Referring provider: Odalys Jernigan MD  Dx:   Encounter Diagnosis     ICD-10-CM    1  Chronic bilateral low back pain with bilateral sciatica  M54 42     M54 41     G89 29        Start Time: 1102  Stop Time: 2677  Total time in clinic (min): 55 minutes    Subjective: patient reports improved LB pain  Reports difficulty with sitting for greater 30 mins  Objective: See treatment diary below      Assessment:  Patient was able to progress in therapy without incident or onset of reported pain  Demonstrates improved tolerance to supine position being able to perform majority of stretches and lumbar stabilizing in position  Was able to progress to lateral stepping and sit to stand with cueing on posturing and lateral sway  Introduced [employer] performed with narrow base of support and cueing on set up and mechanics  Required normal rest intervals due to fatigue      Plan: Continue per plan of care  Progress treatment as tolerated         Diagnosis: LBP   Precautions: Asthma, chronic low back pain, poor tolerance with prolonged positioning   POC Expires: 22   Re-evaluation Date: 5/10/22   FOTO Scores/Date: Goal - 37; 22 -    Visit Count 1/10 2/10 3/10 4/10 5/10   Manuals  5/3   Long axis hip distraction  KD KD held                                     Ther Ex   5/3   LTR NV 10x3"  3"x10 ea 3"x10   physioball roll out  10x5" ea 12x 5"   L/R/C  +1x5x ea way at end 3 way 5"x10 ea  3 way 5"x10ea   Bike        SKTC     15"x5 ea   Lateral Stepping     RTB x 2 laps   STS     Low mat w/dowel shukri x 10                           Neuro Re-Ed   5/3   TA isometric - seated NV 10x10" 10x 10" 10x10 10"x10 supine    PPT - seated NV 10x10" 10x 10" w/ half roll at L/S 10"x10 w/half roll @ lumbar    Abd bracing w/ hip add squeeze - seated NV 10x10" 10"x10 10"x10 Supine 10"x10    Abd bracing w/ hip abd - seated NV 10x10" 10"x10  RTB 10"x10  RTB 10"x10   Abd bracing w/ hip march in seated  10x 10x B x10 ea Supine 2x10 ea   SLR w/ab bracing    x10 ea x10 ea   [employer]     NBOS pink x10                                                          Ther Act                                         Modalities

## 2022-05-05 ENCOUNTER — OFFICE VISIT (OUTPATIENT)
Dept: PHYSICAL THERAPY | Facility: CLINIC | Age: 62
End: 2022-05-05
Payer: COMMERCIAL

## 2022-05-05 DIAGNOSIS — M54.41 CHRONIC BILATERAL LOW BACK PAIN WITH BILATERAL SCIATICA: Primary | ICD-10-CM

## 2022-05-05 DIAGNOSIS — M54.42 CHRONIC BILATERAL LOW BACK PAIN WITH BILATERAL SCIATICA: Primary | ICD-10-CM

## 2022-05-05 DIAGNOSIS — G89.29 CHRONIC BILATERAL LOW BACK PAIN WITH BILATERAL SCIATICA: Primary | ICD-10-CM

## 2022-05-05 PROCEDURE — 97110 THERAPEUTIC EXERCISES: CPT

## 2022-05-05 PROCEDURE — 97112 NEUROMUSCULAR REEDUCATION: CPT

## 2022-05-05 NOTE — PROGRESS NOTES
Daily Note     Today's date: 2022  Patient name: Aarti Liz  : 1960  MRN: [id number]  Referring provider: Judith Lynn MD  Dx:   Encounter Diagnosis     ICD-10-CM    1  Chronic bilateral low back pain with bilateral sciatica  M54 42     M54 41     G89 29        Start Time: 1100  Stop Time: 1151  Total time in clinic (min): 51 minutes    Subjective: patient reports normal soreness without pain with progressions made last therapy visit  Reports having increase ankle discomfort coming into therapy stating that she has tendonitis  Objective: See treatment diary below      Assessment:  Patient was able to complete therapy without reported increase to baseline  Patient is reliant on external cues for exercise sequencing along with rep/hold counts  Was able to increase repetitions with supine marches without issue  Was able to utilize recumbent bike as initial warmup without onset of pain  Plan: Continue per plan of care  Progress treatment as tolerated         Diagnosis: LBP   Precautions: Asthma, chronic low back pain, poor tolerance with prolonged positioning   POC Expires: 22   Re-evaluation Date: 5/10/22   FOTO Scores/Date: Goal - 43; 22 -    Visit Count 6/10 /2/10 3/10 4/10 5/10   Manuals  5/3   Long axis hip distraction   KD held                                     Ther Ex  53   LTR 5"x10  10x3"  3"x10 ea 3"x10   physioball roll out 5"x10 10x5" ea 12x 5"   L/R/C  +1x5x ea way at end 3 way 5"x10 ea  3 way 5"x10ea   Bike L1 x 5 mins       SKTC 15"x5    15"x5 ea   Lateral Stepping     RTB x 2 laps   STS     Low mat w/dowel shukri x 10   DKTC 10"x10                       Neuro Re-Ed  53   TA isometric - seated 10"x10 10x10" 10x 10" 10x10 10"x10 supine    PPT - seated 10"x10 w/half roll 10x10" 10x 10" w/ half roll at L/S 10"x10 w/half roll @ lumbar    Abd bracing w/ hip add squeeze - seated 10"x10 10x10" 10"x10 10"x10 Supine 10"x10    Abd bracing w/ hip abd - seated RTB 10"x10 10x10" 10"x10  RTB 10"x10  RTB 10"x10   Abd bracing w/ hip march in seated Supine 2x10 ea 10x 10x B x10 ea Supine 2x10 ea   SLR w/ab bracing x10 ea   x10 ea x10 ea   [employer]     NBOS pink x10                                                          Ther Act                                         Modalities

## 2022-05-10 ENCOUNTER — APPOINTMENT (OUTPATIENT)
Dept: PHYSICAL THERAPY | Facility: CLINIC | Age: 62
End: 2022-05-10
Payer: COMMERCIAL

## 2022-05-12 ENCOUNTER — APPOINTMENT (OUTPATIENT)
Dept: PHYSICAL THERAPY | Facility: CLINIC | Age: 62
End: 2022-05-12
Payer: COMMERCIAL

## 2022-05-17 ENCOUNTER — APPOINTMENT (OUTPATIENT)
Dept: PHYSICAL THERAPY | Facility: CLINIC | Age: 62
End: 2022-05-17
Payer: COMMERCIAL

## 2022-05-19 ENCOUNTER — EVALUATION (OUTPATIENT)
Dept: PHYSICAL THERAPY | Facility: CLINIC | Age: 62
End: 2022-05-19
Payer: COMMERCIAL

## 2022-05-19 DIAGNOSIS — M54.42 CHRONIC BILATERAL LOW BACK PAIN WITH BILATERAL SCIATICA: Primary | ICD-10-CM

## 2022-05-19 DIAGNOSIS — M54.41 CHRONIC BILATERAL LOW BACK PAIN WITH BILATERAL SCIATICA: Primary | ICD-10-CM

## 2022-05-19 DIAGNOSIS — G89.29 CHRONIC BILATERAL LOW BACK PAIN WITH BILATERAL SCIATICA: Primary | ICD-10-CM

## 2022-05-19 PROCEDURE — 97112 NEUROMUSCULAR REEDUCATION: CPT

## 2022-05-19 PROCEDURE — 97110 THERAPEUTIC EXERCISES: CPT

## 2022-05-19 NOTE — PROGRESS NOTES
PT Re-Evaluation      Collection of subjective/objective data performed by Lázaro Duncan PTA; Assessment, POC, and Goal attainment performed by Leslie Banuelos DPT     Today's date: 2022  Patient name: Nicanor Ornelas  : 1960  MRN: [id number]  Referring provider: Mita Shell MD  Dx:   Encounter Diagnosis     ICD-10-CM    1  Chronic bilateral low back pain with bilateral sciatica  M54 42     M54 41     G89 29        Start Time: 1146  Stop Time: 1228  Total time in clinic (min): 42 minutes    Assessment  Assessment details: Patient is a 65 y o  female with chronic low back pain with radiculopathy and has completed 7 visits to date with improved FOTO score of 29 to 40 since initial evaluation  Patient demonstrates notable subjective/objective improvement since enrolling in PT to include improving lumbar ROM, pain, and activity tolerance, however continues to exhibit lingering deficits to include limited lumbar mobility, pain, weakness, decreased ambulatory status, and limited activity tolerance that continues to impact tolerance/ability to perform ADLs and recreational activities  Patient will benefit from continued skilled PT intervention to address the aforementioned impairments, achieve goals, maximize function, and improve quality of life  Pt is in agreement with this plan          Impairments: abnormal gait, abnormal or restricted ROM, activity intolerance, impaired physical strength, lacks appropriate home exercise program, pain with function, poor posture  and poor body mechanics  Understanding of Dx/Px/POC: good   Prognosis: good    Goals  ST weeks  Pt will restore full and pain-free lumbar spine AROM to allow return to normal ADLs (progressing)  Pt will demonstrate centralization of symptoms with repeated movements and/or traction of lumbar spine (progressing)  Pt will decrease low back pain to 3-4/10 (progressing)  Pt will demonstrate good understanding and compliance with HEP (on going)  Pt will demonstrate improved postural awareness and ability to self-correct without reliance on external cues (progressing)  LT weeks  Pt will demonstrate abolished radicular symptoms for 2 weeks (progressing)  Pt will decrease low back pain to 1-2/10 (progressing)  Pt will exhibit proper squatting/lifting mechanics without reliance on external cues for correction of form  (progressing)  Pt will be able to tolerate prolonged standing/sitting/walking activities > 30 minutes without provocation of low back pain  (progressing)  Pt will improve FOTO score to > or = to 43 to indicate improved functional abilities (progressing)      Plan  Plan details: Cont to tx per POC  Patient would benefit from: skilled physical therapy  Planned modality interventions: cryotherapy and thermotherapy: hydrocollator packs  Planned therapy interventions: ADL training, manual therapy, neuromuscular re-education, patient education, postural training, self care, strengthening, stretching, therapeutic activities, therapeutic exercise, home exercise program, graded exercise, graded activity, gait training, functional ROM exercises, flexibility and body mechanics training  Frequency: 2x week  Duration in weeks: 4  Plan of Care beginning date: 2022  Plan of Care expiration date: 2022  Treatment plan discussed with: patient        Subjective Evaluation    History of Present Illness  Mechanism of injury: Patient reports improved but continued pain  Reports limited mobility with difficulty with ADL's and self care  Reports pain with shower and doing dishes due to standing time  Reports being able to sit for greater time before onset of pain  Reports less difficulty getting out of chair    Reports most pain on R sided with radicular symptoms posterior  Pain  Current pain ratin  At best pain ratin  At worst pain rating: 10  Quality: dull ache, sharp and throbbing    Patient Goals  Patient goals for therapy: decreased pain, increased strength, independence with ADLs/IADLs, return to sport/leisure activities and increased motion          Objective     General Comments:      Lumbar Comments  Posture: FHP, RS, increased thoracic kyphosis, increased lordosis, ant pelvic tilt    Lumbar AROM: Flex min limitation (pain) Extension mod limitation (pain) Left SB mod limitation (pain) Right SB min limitation (pain)    Hip MMT Flex: R 3+/5  L 3+/5,  Abd 5/5 BL,  Ext 5/5 BL    SLR: limited due to onset of low back pain  90/90 HS flexibility: (R) 30*,  (L) 27*    FOTO:40  (29 @ IE)             Diagnosis: LBP   Precautions: Asthma, chronic low back pain, poor tolerance with prolonged positioning   POC Expires: 6/16/22   Re-evaluation Date: 6/16/22   FOTO Scores/Date: Goal - 43; 4/12/22 - 29 , 5/19/22-40   Visit Count 6/10 7/10 3/10 4/10 5/10   Manuals 5/5 5/19 5/26 4/28 5/3   Long axis hip distraction    held                                     Ther Ex 5/5 5/19 4/28 5/3   LTR 5"x10    3"x10 ea 3"x10   physioball roll out 5"x10  12x 5"   L/R/C  +1x5x ea way at end 3 way 5"x10 ea  3 way 5"x10ea   Bike L1 x 5 mins       SKTC 15"x5    15"x5 ea   Lateral Stepping     RTB x 2 laps   STS     Low mat w/dowel shukri x 10   DKTC 10"x10       Wall Wash  5"x10      FOTO  Administered performed, subjective objective data collected for re-dariel      Hamstring Stretch w/strap  30"x3 supine      Neuro Re-Ed 5/5 4/21 4/26 4/28 5/3   TA isometric - seated 10"x10 10"x10 10x 10" 10x10 10"x10 supine    PPT - seated 10"x10 w/half roll  10x 10" w/ half roll at L/S 10"x10 w/half roll @ lumbar    Abd bracing w/ hip add squeeze - seated 10"x10  10"x10 10"x10 Supine 10"x10    Abd bracing w/ hip abd - seated RTB 10"x10  10"x10  RTB 10"x10  RTB 10"x10   Abd bracing w/ hip march in seated Supine 2x10 ea 2x10 ea 10x B x10 ea Supine 2x10 ea   SLR w/ab bracing x10 ea 2x10 ea  x10 ea x10 ea   [employer]     NBOS pink x10 Ther Act                                         Modalities

## 2022-05-19 NOTE — LETTER
May 19, 2022    Arpit Gibson MD  [facility] [address]    Patient: Renda Sandifer   YOB: 1960   Date of Visit: 2022     Encounter Diagnosis     ICD-10-CM    1  Chronic bilateral low back pain with bilateral sciatica  M54 42     M54 41     G89 29        Dear Dr Denisa Alexander: Thank you for your recent referral of Renda Sandifer  Please review the attached evaluation summary from Ana María's recent visit  Please verify that you agree with the plan of care by signing the attached order  If you have any questions or concerns, please do not hesitate to call  I sincerely appreciate the opportunity to share in the care of one of your patients and hope to have another opportunity to work with you in the near future  Sincerely,    Carolyn Amaya PT      Referring Provider:      I certify that I have read the below Plan of Care and certify the need for these services furnished under this plan of treatment while under my care  Arpit Gibson MD  [facility] [id number]  Via Fax: [phone]          PT Re-Evaluation      Collection of subjective/objective data performed by Ajit Morales PTA; Assessment, POC, and Goal attainment performed by Carolyn Amaya DPT     Today's date: 2022  Patient name: Renda Sandifer  : 1960  MRN: [id number]  Referring provider: Bri Guerrero MD  Dx:   Encounter Diagnosis     ICD-10-CM    1  Chronic bilateral low back pain with bilateral sciatica  M54 42     M54 41     G89 29        Start Time: 1146  Stop Time: 1228  Total time in clinic (min): 42 minutes    Assessment  Assessment details: Patient is a 65 y o  female with chronic low back pain with radiculopathy and has completed 7 visits to date with improved FOTO score of 29 to 40 since initial evaluation   Patient demonstrates notable subjective/objective improvement since enrolling in PT to include improving lumbar ROM, pain, and activity tolerance, however continues to exhibit lingering deficits to include limited lumbar mobility, pain, weakness, decreased ambulatory status, and limited activity tolerance that continues to impact tolerance/ability to perform ADLs and recreational activities  Patient will benefit from continued skilled PT intervention to address the aforementioned impairments, achieve goals, maximize function, and improve quality of life  Pt is in agreement with this plan          Impairments: abnormal gait, abnormal or restricted ROM, activity intolerance, impaired physical strength, lacks appropriate home exercise program, pain with function, poor posture  and poor body mechanics  Understanding of Dx/Px/POC: good   Prognosis: good    Goals  ST weeks  Pt will restore full and pain-free lumbar spine AROM to allow return to normal ADLs (progressing)  Pt will demonstrate centralization of symptoms with repeated movements and/or traction of lumbar spine (progressing)  Pt will decrease low back pain to 3-4/10 (progressing)  Pt will demonstrate good understanding and compliance with HEP (on going)  Pt will demonstrate improved postural awareness and ability to self-correct without reliance on external cues (progressing)  LT weeks  Pt will demonstrate abolished radicular symptoms for 2 weeks (progressing)  Pt will decrease low back pain to 1-2/10 (progressing)  Pt will exhibit proper squatting/lifting mechanics without reliance on external cues for correction of form  (progressing)  Pt will be able to tolerate prolonged standing/sitting/walking activities > 30 minutes without provocation of low back pain  (progressing)  Pt will improve FOTO score to > or = to 43 to indicate improved functional abilities (progressing)      Plan  Plan details: Cont to tx per POC  Patient would benefit from: skilled physical therapy  Planned modality interventions: cryotherapy and thermotherapy: hydrocollator packs  Planned therapy interventions: ADL training, manual therapy, neuromuscular re-education, patient education, postural training, self care, strengthening, stretching, therapeutic activities, therapeutic exercise, home exercise program, graded exercise, graded activity, gait training, functional ROM exercises, flexibility and body mechanics training  Frequency: 2x week  Duration in weeks: 4  Plan of Care beginning date: 2022  Plan of Care expiration date: 2022  Treatment plan discussed with: patient        Subjective Evaluation    History of Present Illness  Mechanism of injury: Patient reports improved but continued pain  Reports limited mobility with difficulty with ADL's and self care  Reports pain with shower and doing dishes due to standing time  Reports being able to sit for greater time before onset of pain  Reports less difficulty getting out of chair    Reports most pain on R sided with radicular symptoms posterior  Pain  Current pain ratin  At best pain ratin  At worst pain rating: 10  Quality: dull ache, sharp and throbbing    Patient Goals  Patient goals for therapy: decreased pain, increased strength, independence with ADLs/IADLs, return to sport/leisure activities and increased motion          Objective     General Comments:      Lumbar Comments  Posture: FHP, RS, increased thoracic kyphosis, increased lordosis, ant pelvic tilt    Lumbar AROM: Flex min limitation (pain) Extension mod limitation (pain) Left SB mod limitation (pain) Right SB min limitation (pain)    Hip MMT Flex: R 3+/5  L 3+/5,  Abd 5/5 BL,  Ext 5/5 BL    SLR: limited due to onset of low back pain  90/90 HS flexibility: (R) 30*,  (L) 27*    FOTO:40  (29 @ IE)             Diagnosis: LBP   Precautions: Asthma, chronic low back pain, poor tolerance with prolonged positioning   POC Expires: 22   Re-evaluation Date: 22   FOTO Scores/Date: Goal - 43; 22 - 29 , 22-40   Visit Count 6/10 7/10 3/10 4/10 5/10   Manuals  5/19 5/26 4/28 5/3   Long axis hip distraction    held                                     Ther Ex 5/5 5/19  4/28 5/3   LTR 5"x10    3"x10 ea 3"x10   physioball roll out 5"x10  12x 5"   L/R/C  +1x5x ea way at end 3 way 5"x10 ea  3 way 5"x10ea   Bike L1 x 5 mins       SKTC 15"x5    15"x5 ea   Lateral Stepping     RTB x 2 laps   STS     Low mat w/dowel shukri x 10   DKTC 10"x10       Wall Wash  5"x10      FOTO  Administered performed, subjective objective data collected for re-dariel      Hamstring Stretch w/strap  30"x3 supine      Neuro Re-Ed 5/5 4/21 4/26 4/28 5/3   TA isometric - seated 10"x10 10"x10 10x 10" 10x10 10"x10 supine    PPT - seated 10"x10 w/half roll  10x 10" w/ half roll at L/S 10"x10 w/half roll @ lumbar    Abd bracing w/ hip add squeeze - seated 10"x10  10"x10 10"x10 Supine 10"x10    Abd bracing w/ hip abd - seated RTB 10"x10  10"x10  RTB 10"x10  RTB 10"x10   Abd bracing w/ hip march in seated Supine 2x10 ea 2x10 ea 10x B x10 ea Supine 2x10 ea   SLR w/ab bracing x10 ea 2x10 ea  x10 ea x10 ea   Pallof Press     NBOS pink x10                                                          Ther Act                                         Modalities                                                     Attestation signed by Emiliano Casey PT at 5/19/2022  4:59 PM:  I supervised the visit  We discussed the case to ensure appropriate continuation and progression of care and I reviewed the documentation

## 2022-05-24 ENCOUNTER — OFFICE VISIT (OUTPATIENT)
Dept: PHYSICAL THERAPY | Facility: CLINIC | Age: 62
End: 2022-05-24
Payer: COMMERCIAL

## 2022-05-24 DIAGNOSIS — M54.42 CHRONIC BILATERAL LOW BACK PAIN WITH BILATERAL SCIATICA: Primary | ICD-10-CM

## 2022-05-24 DIAGNOSIS — M54.41 CHRONIC BILATERAL LOW BACK PAIN WITH BILATERAL SCIATICA: Primary | ICD-10-CM

## 2022-05-24 DIAGNOSIS — G89.29 CHRONIC BILATERAL LOW BACK PAIN WITH BILATERAL SCIATICA: Primary | ICD-10-CM

## 2022-05-24 PROCEDURE — 97140 MANUAL THERAPY 1/> REGIONS: CPT | Performed by: PHYSICAL THERAPIST

## 2022-05-24 PROCEDURE — 97112 NEUROMUSCULAR REEDUCATION: CPT | Performed by: PHYSICAL THERAPIST

## 2022-05-24 PROCEDURE — 97110 THERAPEUTIC EXERCISES: CPT | Performed by: PHYSICAL THERAPIST

## 2022-05-24 NOTE — PROGRESS NOTES
Daily Note     Today's date: 2022  Patient name: Sharon Morales  : 1960  MRN: [id number]  Referring provider: David Mooney MD  Dx:   Encounter Diagnosis     ICD-10-CM    1  Chronic bilateral low back pain with bilateral sciatica  M54 42     M54 41     G89 29        Start Time: 1100  Stop Time: 1145  Total time in clinic (min): 45 minutes    Subjective: Pt reports current pain is about 5/10 and attributes this to the weather  Pt overall feels she is progressing well in therapy  Objective: See treatment diary below      Assessment: Pt continues to respond well to long axis hip distraction bilaterally with ability to reduce lower back pain, had to adjust right sided long axis distraction to pull at knee versus distal leg due to ankle pain  Pt demonstrates much improved tolerance with prolonged posturing tolerance and able to complete all mat table exercises without increase in lower back pain or needing to reposition  Pt continues to progress well with standing strengthening exercises and remains heavily reliant on cues during sit to stands and pallof press for correction of form/sequencing but able to complete without provoking back pain  Pt was provided with updated HEP and encouraged to continue with daily as symptoms allow  Plan: Continue per plan of care  Progress treatment as tolerated         Diagnosis: LBP   Precautions: Asthma, chronic low back pain, poor tolerance with prolonged positioning   POC Expires: 22   Re-evaluation Date: 22   FOTO Scores/Date: Goal - 43; 22 - 29 , 22-40   Visit Count 610 1/10 2/10 4/10 5/10   Manuals  5/3   Long axis hip distraction    KD bilaterally                                     Ther Ex  5/3   LTR 5"x10   10x3" ea 3"x10 ea 3"x10   physioball roll out 5"x10  10x5" ea 3 way 5"x10 ea  3 way 5"x10ea   Bike L1 x 5 mins       SKTC 15"x5  15" x 3  15"x5 ea   Lateral Stepping     RTB x 2 laps   STS 10x - cues for form/eccentric control  Low mat w/dowel shukri x 10   DKTC 10"x10       Wall Wash  5"x10      FOTO  Administered performed, subjective objective data collected for re-dariel      Hamstring Stretch w/strap  30"x3 supine 3x30" supine     Neuro Re-Ed 5/5 4/21 5/24 4/28 5/3   TA isometric - seated 10"x10 10"x10 10x 10" 10x10 10"x10 supine    PPT - seated 10"x10 w/half roll  10x 10" w/ half roll at L/S 10"x10 w/half roll @ lumbar    Abd bracing w/ hip add squeeze - seated 10"x10  10"x10 hooklying 10"x10 Supine 10"x10    Abd bracing w/ hip abd - seated RTB 10"x10  grn 10"x10 hooklying RTB 10"x10  RTB 10"x10   Abd bracing w/ hip march in seated Supine 2x10 ea 2x10 ea 10x 2# alternating in seated x10 ea Supine 2x10 ea   SLR w/ab bracing x10 ea 2x10 ea 2x10 ea x10 ea x10 ea   Pallof Press   NBOS yellow 10x ea  NBOS pink x10                                                          Ther Act                                         Modalities

## 2022-05-26 ENCOUNTER — OFFICE VISIT (OUTPATIENT)
Dept: PHYSICAL THERAPY | Facility: CLINIC | Age: 62
End: 2022-05-26
Payer: COMMERCIAL

## 2022-05-26 DIAGNOSIS — M54.41 CHRONIC BILATERAL LOW BACK PAIN WITH BILATERAL SCIATICA: Primary | ICD-10-CM

## 2022-05-26 DIAGNOSIS — G89.29 CHRONIC BILATERAL LOW BACK PAIN WITH BILATERAL SCIATICA: Primary | ICD-10-CM

## 2022-05-26 DIAGNOSIS — M54.42 CHRONIC BILATERAL LOW BACK PAIN WITH BILATERAL SCIATICA: Primary | ICD-10-CM

## 2022-05-26 PROCEDURE — 97112 NEUROMUSCULAR REEDUCATION: CPT

## 2022-05-26 PROCEDURE — 97110 THERAPEUTIC EXERCISES: CPT

## 2022-05-26 PROCEDURE — 97140 MANUAL THERAPY 1/> REGIONS: CPT

## 2022-05-26 NOTE — PROGRESS NOTES
Daily Note     Today's date: 2022  Patient name: Kenisha Borjas  : 1960  MRN: [id number]  Referring provider: Alessia Myles MD  Dx:   Encounter Diagnosis     ICD-10-CM    1  Chronic bilateral low back pain with bilateral sciatica  M54 42     M54 41     G89 29        Start Time: 1058  Stop Time: 1143  Total time in clinic (min): 45 minutes    Subjective: patient reports LB is feeling "good"  Reports having increased foot pain today due to plantar fascitis  Denies any new incidents or increased LB pain since last therapy session      Objective: See treatment diary below      Assessment: patient was able to complete supine and HL exercises without incident  Patient is reliant on external cues for sequencing along rep/hold counts  Requires normal rest intervals due to fatigue  Declined standing sit to stand and pallof press due to feet pain      Plan: Continue per plan of care  Progress treatment as tolerated         Diagnosis: LBP   Precautions: Asthma, chronic low back pain, poor tolerance with prolonged positioning   POC Expires: 22   Re-evaluation Date: 22   FOTO Scores/Date: Goal - 37; 22 -  , 22-   Visit Count 6/10 1/10 2/10 4/10 5/10   Manuals  5/3   Long axis hip distraction    KD bilaterally ESTEPHANIA bilaterally                                    Ther Ex  5/3   LTR 5"x10   10x3" ea 3"x10 ea 3"x10   physioball roll out 5"x10  10x5" ea  3 way 5"x10ea   Bike L1 x 5 mins       SKTC 15"x5  15" x 3 15"x5 15"x5 ea   Lateral Stepping     RTB x 2 laps   STS   10x - cues for form/eccentric control  Low mat w/dowel shukri x 10   DKTC 10"x10       Wall Wash  5"x10      FOTO  Administered performed, subjective objective data collected for re-dariel      Hamstring Stretch w/strap  30"x3 supine 3x30" supine 30"x3 supine     Neuro Re-Ed  53   TA isometric - seated 10"x10 10"x10 10x 10" 10"x10 HL 10"x10 supine    PPT - seated 10"x10 w/half roll  10x 10" w/ half roll at L/S     Abd bracing w/ hip add squeeze - seated 10"x10  10"x10 hooklying 10"x10 HL Supine 10"x10    Abd bracing w/ hip abd - seated RTB 10"x10  grn 10"x10 hooklying GTB 10"x10 HL  RTB 10"x10   Abd bracing w/ hip march in seated Supine 2x10 ea 2x10 ea 10x 2# alternating in seated x10 ea Supine 2x10 ea   SLR w/ab bracing x10 ea 2x10 ea 2x10 ea 2x10 x10 ea   Pallof Press   NBOS yellow 10x ea  NBOS pink x10                                                          Ther Act                                         Modalities

## 2022-06-01 ENCOUNTER — OFFICE VISIT (OUTPATIENT)
Dept: PHYSICAL THERAPY | Facility: CLINIC | Age: 62
End: 2022-06-01
Payer: COMMERCIAL

## 2022-06-01 DIAGNOSIS — M54.41 CHRONIC BILATERAL LOW BACK PAIN WITH BILATERAL SCIATICA: Primary | ICD-10-CM

## 2022-06-01 DIAGNOSIS — G89.29 CHRONIC BILATERAL LOW BACK PAIN WITH BILATERAL SCIATICA: Primary | ICD-10-CM

## 2022-06-01 DIAGNOSIS — M54.42 CHRONIC BILATERAL LOW BACK PAIN WITH BILATERAL SCIATICA: Primary | ICD-10-CM

## 2022-06-01 PROCEDURE — 97112 NEUROMUSCULAR REEDUCATION: CPT | Performed by: PHYSICAL THERAPIST

## 2022-06-01 PROCEDURE — 97110 THERAPEUTIC EXERCISES: CPT | Performed by: PHYSICAL THERAPIST

## 2022-06-01 PROCEDURE — 97140 MANUAL THERAPY 1/> REGIONS: CPT | Performed by: PHYSICAL THERAPIST

## 2022-06-01 NOTE — PROGRESS NOTES
Daily Note     Today's date: 2022  Patient name: Samantha Leyva  : 1960  MRN: [id number]  Referring provider: Ghada Garcia MD  Dx:   Encounter Diagnosis     ICD-10-CM    1  Chronic bilateral low back pain with bilateral sciatica  M54 42     M54 41     G89 29        Start Time: 1015  Stop Time: 1100  Total time in clinic (min): 45 minutes    Subjective: Pt reports improving lower back pain and rates pain 2/10 today which is the best her back has felt, having more feet problems today and is to see a podiatrist later today  Objective: See treatment diary below      Assessment: Pt requires significant modification of session today due to limited tolerance for standing and hooklying positioning due to bilateral feet pain that has been bothering her for past week or so  Pt was able to perform all most mat table and seated exercises today with good tolerance and no provocation of pain, however held standing exercises secondary to pt request to not further exacerbate feet pain  Pt was instructed to continue with tolerable ROM/flexibility exercises as her lower back and feet tolerate, will progress exercises next visit as symptoms allow  Plan: Continue per plan of care  Progress treatment as tolerated         Diagnosis: LBP   Precautions: Asthma, chronic low back pain, poor tolerance with prolonged positioning   POC Expires: 22   Re-evaluation Date: 22   FOTO Scores/Date: Goal - 43; 22 -  , 22-40   Visit Count 6/10 1/10 2/10 4/10 5/10   Manuals  6   Long axis hip distraction    KD bilaterally ESTEPHANIA bilaterally KD bilaterally                                   Ther Ex  6   LTR 5"x10   10x3" ea 3"x10 ea 3"x10   physioball roll out 5"x10  10x5" ea  3 way 5"x10ea   Bike L1 x 5 mins       SKTC 15"x5  15" x 3 15"x5 15"x5 ea   Lateral Stepping     Held today   STS   10x - cues for form/eccentric control     DKTC 10"x10       Wall Wash  5"x10 FOTO  Administered performed, subjective objective data collected for re-dariel      Hamstring Stretch w/strap  30"x3 supine 3x30" supine 30"x3 supine  3x30" supine   Neuro Re-Ed 5/5 4/21 5/24 5/26 6/1   TA isometric - seated 10"x10 10"x10 10x 10" 10"x10 HL 10"x10 supine    PPT - seated 10"x10 w/half roll  10x 10" w/ half roll at L/S  10x10"   Abd bracing w/ hip add squeeze - seated 10"x10  10"x10 hooklying 10"x10 HL Supine 10"x10    Abd bracing w/ hip abd - seated RTB 10"x10  grn 10"x10 hooklying GTB 10"x10 HL  grn 10"x10   Abd bracing w/ hip march in seated Supine 2x10 ea 2x10 ea 10x 2# alternating in seated x10 ea Supine 2x10 ea   SLR w/ab bracing x10 ea 2x10 ea 2x10 ea 2x10 2x10 ea 1#   Pallof Press   NBOS yellow 10x ea  Held today                                                          Ther Act                                         Modalities

## 2022-06-07 ENCOUNTER — OFFICE VISIT (OUTPATIENT)
Dept: PHYSICAL THERAPY | Facility: CLINIC | Age: 62
End: 2022-06-07
Payer: COMMERCIAL

## 2022-06-07 DIAGNOSIS — M54.41 CHRONIC BILATERAL LOW BACK PAIN WITH BILATERAL SCIATICA: Primary | ICD-10-CM

## 2022-06-07 DIAGNOSIS — M54.42 CHRONIC BILATERAL LOW BACK PAIN WITH BILATERAL SCIATICA: Primary | ICD-10-CM

## 2022-06-07 DIAGNOSIS — G89.29 CHRONIC BILATERAL LOW BACK PAIN WITH BILATERAL SCIATICA: Primary | ICD-10-CM

## 2022-06-07 PROCEDURE — 97140 MANUAL THERAPY 1/> REGIONS: CPT

## 2022-06-07 PROCEDURE — 97112 NEUROMUSCULAR REEDUCATION: CPT

## 2022-06-07 PROCEDURE — 97110 THERAPEUTIC EXERCISES: CPT

## 2022-06-07 NOTE — PROGRESS NOTES
Daily Note     Today's date: 2022  Patient name: Linda Liu  : 1960  MRN: [id number]  Referring provider: Jordyn Webb MD  Dx:   Encounter Diagnosis     ICD-10-CM    1  Chronic bilateral low back pain with bilateral sciatica  M54 42     M54 41     G89 29        Start Time: 1100  Stop Time: 1151  Total time in clinic (min): 51 minutes    Subjective: patient reports increased LB and feet pain coming into therapy  Reports being on feet for increase period of time yesterday cooking and cleaning  Objective: See treatment diary below      Assessment:  Patient was limited in therapy due to BL feet and LB discomfort  Required performance of hamstring stretch performing in seated due to reported hip/LB pain when attempted in supine  Requires cueing throughout on sequencing of exercises and working with in limitations  Plan: Continue per plan of care  Progress treatment as tolerated         Diagnosis: LBP   Precautions: Asthma, chronic low back pain, poor tolerance with prolonged positioning   POC Expires: 22   Re-evaluation Date: 22   FOTO Scores/Date: Goal - 37; 22 -  , 22-   Visit Count 6/10 1/10 2/10 4/10 5/10   Manuals    Long axis hip distraction    KD bilaterally ESTEPHANIA bilaterally KD bilaterally                                   Ther Ex    LTR 3"x10  10x3" ea 3"x10 ea 3"x10   physioball roll out   10x5" ea  3 way 5"x10ea   Bike        SKTC 15"x5 ea  15" x 3 15"x5 15"x5 ea   Lateral Stepping     Held today   STS   10x - cues for form/eccentric control     DKTC        Wall Wash  5"x10      FOTO  Administered performed, subjective objective data collected for re-dariel      Hamstring Stretch w/strap 30"x3 w/strap seated 30"x3 supine 3x30" supine 30"x3 supine  3x30" supine   Neuro Re-Ed    TA isometric - seated 10"x10 supine 10"x10 10x 10" 10"x10 HL 10"x10 supine    PPT - seated 10"x10  10x 10" w/ half roll at L/S  10x10"   Abd bracing w/ hip add squeeze - seated Supine 10"x10  10"x10 hooklying 10"x10 HL Supine 10"x10    Abd bracing w/ hip abd - seated GTB 10'x10 HL  grn 10"x10 hooklying GTB 10"x10 HL  grn 10"x10   Abd bracing w/ hip march in seated Supine 2x01 ea  2x10 ea 10x 2# alternating in seated x10 ea Supine 2x10 ea   SLR w/ab bracing Held 2x10 ea 2x10 ea 2x10 2x10 ea 1#   Pallof Press Held  NBOS yellow 10x ea  Held today                                                         Ther Act                                      Modalities

## 2022-06-09 ENCOUNTER — OFFICE VISIT (OUTPATIENT)
Dept: PHYSICAL THERAPY | Facility: CLINIC | Age: 62
End: 2022-06-09
Payer: COMMERCIAL

## 2022-06-09 DIAGNOSIS — G89.29 CHRONIC BILATERAL LOW BACK PAIN WITH BILATERAL SCIATICA: Primary | ICD-10-CM

## 2022-06-09 DIAGNOSIS — M54.42 CHRONIC BILATERAL LOW BACK PAIN WITH BILATERAL SCIATICA: Primary | ICD-10-CM

## 2022-06-09 DIAGNOSIS — M54.41 CHRONIC BILATERAL LOW BACK PAIN WITH BILATERAL SCIATICA: Primary | ICD-10-CM

## 2022-06-09 PROCEDURE — 97110 THERAPEUTIC EXERCISES: CPT

## 2022-06-09 NOTE — PROGRESS NOTES
PT Discharge     Collection of subjective/objective data performed by Kyaw Issa PTA; Assessment, POC, and Goal attainment performed by Lucy Weber DPT     Today's date: 2022  Patient name: Cesar Maciel  : 1960  MRN: [id number]  Referring provider: Meliza Kumar MD  Dx:   Encounter Diagnosis     ICD-10-CM    1  Chronic bilateral low back pain with bilateral sciatica  M54 42     M54 41     G89 29        Start Time: 1101  Stop Time: 1141  Total time in clinic (min): 40 minutes    Assessment  Assessment details: Pt has been seen for 12 visits and has made excellent subjective/objective improvements since enrolling in therapy with improved FOTO score from 29 to 36 since initial evaluation  Pt has returned most normal ADLs and recreational activities with improving levels of pain or limitation, however having more limitation due to her feet which have impacted exercise progression for her lower back  Pt requests to discharge her back at this time to transition to treatment for her feet as this is what is causing most pain and limitation for her currently  Pt encouraged to continue with HEP on regular basis per tolerance and was educated on how to progress/regress exercises per symptoms/tolerance  Pt is in agreement with plan            Understanding of Dx/Px/POC: good   Prognosis: good    Goals  ST weeks  Pt will restore full and pain-free lumbar spine AROM to allow return to normal ADLs NEARLY MET  Pt will demonstrate centralization of symptoms with repeated movements and/or traction of lumbar spine MET  Pt will decrease low back pain to 3-4/10 MET  Pt will demonstrate good understanding and compliance with HEP MET  Pt will demonstrate improved postural awareness and ability to self-correct without reliance on external cues MET    LT weeks  Pt will demonstrate abolished radicular symptoms for 2 weeks MET  Pt will decrease low back pain to 1-2/10 NEARLY MET  Pt will exhibit proper squatting/lifting mechanics without reliance on external cues for correction of form  MET  Pt will be able to tolerate prolonged standing/sitting/walking activities > 30 minutes without provocation of low back pain MET  Pt will improve FOTO score to > or = to 43 to indicate improved functional abilities NOT MET      Plan  Plan details: Discharge to [facility]  Patient would benefit from: skilled physical therapy  Planned modality interventions: cryotherapy and thermotherapy: hydrocollator packs  Planned therapy interventions: ADL training, manual therapy, neuromuscular re-education, patient education, postural training, self care, strengthening, stretching, therapeutic activities, therapeutic exercise, home exercise program, graded exercise, graded activity, gait training, functional ROM exercises, flexibility and body mechanics training  Plan of Care beginning date: 2022  Plan of Care expiration date: 2022  Treatment plan discussed with: patient        Subjective Evaluation    History of Present Illness  Mechanism of injury: Patient reports overall improvement in terms of lower back  Reports less severe pain  Reports with house work and weather dependant  Reports being able to stand for approx 30 mins before onset pain  Reports sitting tolerance up to 1 hr  Reports 75-80% overall improvement  Patient reports being more limited and pain with BL feet    Patient has PT script for feet and would like to transition to address and continue with LB at home  Pain  Current pain ratin  At best pain ratin  At worst pain ratin  Quality: dull ache, sharp and throbbing    Patient Goals  Patient goals for therapy: decreased pain, increased strength, independence with ADLs/IADLs, return to sport/leisure activities and increased motion          Objective     General Comments:      Lumbar Comments  Posture: FHP, RS, increased thoracic kyphosis, increased lordosis, ant pelvic tilt    Lumbar AROM: Flex min limitation (min pain) Extension min limitation (pain) Left SB mod limitation (pain) Right SB min limitation (pain)    Hip MMT Flex: R 4/5  L 4-/5,  Abd 5/5 BL,  Ext 5/5 BL      90/90 HS flexibility: (R) 27*,  (L) 18*    FOTO: 36 (29 at IE)               Diagnosis: LBP   Precautions: Asthma, chronic low back pain, poor tolerance with prolonged positioning   POC Expires: 6/16/22   Re-evaluation Date: 6/16/22   FOTO Scores/Date: Goal - 37; 4/12/22 - 29 , 5/19/22-40   Visit Count 6/10 7/10 2/10 4/10 5/10   Manuals 6/7 6/9 5/24 5/26 6/1   Long axis hip distraction    KD bilaterally ESTEPHANIA bilaterally KD bilaterally                                   Ther Ex 6/7 6/9 5/24 5/26 6/1   LTR 3"x10  10x3" ea 3"x10 ea 3"x10   physioball roll out   10x5" ea  3 way 5"x10ea   Bike        SKTC 15"x5 ea  15" x 3 15"x5 15"x5 ea   Lateral Stepping     Held today   STS   10x - cues for form/eccentric control     DKTC        Wall Wash        FOTO  Administered performed, subjective objective data collected for DC      Hamstring Stretch w/strap 30"x3 w/strap seated  3x30" supine 30"x3 supine  3x30" supine   Neuro Re-Ed 6/7 5/24 5/26 6/1   TA isometric - seated 10"x10 supine  10x 10" 10"x10 HL 10"x10 supine    PPT - seated 10"x10  10x 10" w/ half roll at L/S  10x10"   Abd bracing w/ hip add squeeze - seated Supine 10"x10  10"x10 hooklying 10"x10 HL Supine 10"x10    Abd bracing w/ hip abd - seated GTB 10'x10 HL  grn 10"x10 hooklying GTB 10"x10 HL  grn 10"x10   Abd bracing w/ hip march in seated Supine 2x01 ea   10x 2# alternating in seated x10 ea Supine 2x10 ea   SLR w/ab bracing Held  2x10 ea 2x10 2x10 ea 1#   Pallof Press Held  NBOS yellow 10x ea  Held today                                                         Ther Act                                      Modalities

## 2022-06-13 ENCOUNTER — EVALUATION (OUTPATIENT)
Dept: PHYSICAL THERAPY | Facility: CLINIC | Age: 62
End: 2022-06-13
Payer: COMMERCIAL

## 2022-06-13 DIAGNOSIS — M79.671 BILATERAL FOOT PAIN: Primary | ICD-10-CM

## 2022-06-13 DIAGNOSIS — M79.672 BILATERAL FOOT PAIN: Primary | ICD-10-CM

## 2022-06-13 PROCEDURE — 97110 THERAPEUTIC EXERCISES: CPT | Performed by: PHYSICAL THERAPIST

## 2022-06-13 PROCEDURE — 97140 MANUAL THERAPY 1/> REGIONS: CPT | Performed by: PHYSICAL THERAPIST

## 2022-06-13 PROCEDURE — 97161 PT EVAL LOW COMPLEX 20 MIN: CPT | Performed by: PHYSICAL THERAPIST

## 2022-06-13 NOTE — PROGRESS NOTES
PT Evaluation     Today's date: 2022  Patient name: Ana Rangel  : 1960  MRN: [id number]  Referring provider: Johanna Sicard, DPM  Dx:   Encounter Diagnosis     ICD-10-CM    1  Bilateral foot pain  M79 671     M79 672        Start Time: 4883  Stop Time: 1500  Total time in clinic (min): 45 minutes    Assessment  Assessment details: Patient is a 65 y o  female who presents to physical therapy with c/o bilateral foot pain  Patient presents to evaluation with pain, decreased range of motion, decreased strength, decreased ambulatory status/quality of gait, and decreased tolerance to activity  Objective exam somewhat limited due to diffuse tenderness throughout bilateral feet and pain/guarding  Patient demonstrates fair tolerance to treatment and was provided with a written copy of their initial home exercise program focusing on ankle ROM and was encouraged to perform daily per tolerance  I discussed risks, benefits, and alternatives to treatment, and answered all patient questions to patient satisfaction  Patient presents with baseline FOTO score of 25 indicating limited tolerance/ability to complete ADLs  Patient is an appropriate candidate for skilled PT and would benefit from skilled PT services to address the aforementioned impairments, achieve goals, maximize function, and improve quality of life  Pt is in agreement with this plan      Patient Education: activity modifications as needed, pacing of activities, importance of HEP compliance, PT prognosis/POC   Impairments: abnormal gait, abnormal or restricted ROM, activity intolerance, impaired balance, impaired physical strength, lacks appropriate home exercise program, pain with function and weight-bearing intolerance    Goals  ST weeks  Pt will demonstrate good understanding and compliance with HEP  Pt will improve bilateral ankle dorsiflexion to 10 degrees to allow proper toe clearance during mid-swing phase of gait and improve safety with ambulation  Pt will demonstrate pain free ankle AROM WNL all planes   Pt will decrease pain to 5/10      LT weeks  Pt will improve FOTO score to > or = to 47 to indicate improved functional abilities   Pt will be able to perform 10 repetitions with single leg heel raises to indicate improved functional ankle strength/endurance to improve tolerance for ADLs   Pt will be able to perform SLS on non-compliant surfaces for 20" to demonstrates improved safety with balance   Pt will decrease pain to 2-3/10       Plan  Patient would benefit from: PT eval and skilled physical therapy  Planned modality interventions: cryotherapy and thermotherapy: hydrocollator packs  Planned therapy interventions: ADL training, manual therapy, neuromuscular re-education, patient education, self care, strengthening, stretching, therapeutic activities, therapeutic exercise, home exercise program, graded exercise, graded activity, gait training, functional ROM exercises, flexibility, body mechanics training and balance  Frequency: 2x week  Duration in weeks: 8  Plan of Care beginning date: 2022  Plan of Care expiration date: 2022  Treatment plan discussed with: patient        Subjective Evaluation    History of Present Illness  Mechanism of injury: Pt reports to PT with c/o bilateral foot pain that started around the end of February and progressively worsened with time without antecedent trauma  Pt had been treated on site for her lower back but ankle pain became so bad to where she couldn't progress exercises and ultimately decided to discharge lower back as it was doing better and switch to treatment of her feet  Pt was seen by Dr Gal Hinojosa who referred her to PT as she was told she has tendinopathy  Pt ambulates unassisted  Pt does report N/T into her feet that is intermittent  Pt does report intermittent swelling   Pt does report ankle weakness/instability and has had several close calls for falling but was able to grab on to something to avoid falling  Aggravating factors: all WB activities, certain ankle movements    Easing Factors:  Rest, ice, diclofenac gel, elevation    PLOF:  Hx of bilateral foot pain since 2022    PMH: Asthma, chronic low back pain, poor tolerance with prolonged positioning  Pain  Current pain rating: 10  At best pain ratin  At worst pain rating: 10  Quality: sharp, dull ache and throbbing    Patient Goals  Patient goals for therapy: decreased edema, decreased pain, improved balance, increased motion, increased strength, independence with ADLs/IADLs and return to sport/leisure activities          Objective     General Comments:       Ankle/Foot Comments   Posture in WB reveals bilateral pronation (R>L) with slight ER of right leg, bilateral calcaneal valgus     Left ankle AROM: DF -10* PF 35* Inv 13* Ev 4* (pain limiting all planes)  Right ankle AROM: DF -10* PF 30* Inv 4* Ev 2* (pain limiting all planes)    Ankle MMT deferred 2* to pain and limited AROM all planes    Gait Assessment: Pt ambulates unassisted with antalgic gait, short stride length, fwd trunk lean, often reaching for items to hold onto while walking (wall, plinth table, etc)    Inversion Test: (+)  Ant drawer limited 2* to pain and guarding bilaterally  Parish-Fuentes Test: (-)  Syndesmotic squeeze (-)    Palpation: Left: Exquisite TTP along medial and lateral malleoli, ATFL, navicular, calf, plantar aspect of foot diffusely; Right: exquisite TTP along medial/lateral malleoli, ATFL, CFL, PTFL, navicular, calf, and plantar aspect of foot diffusely    FOTO: 25               Diagnosis: Bilateral foot pain   Precautions: Asthma, chronic low back pain, poor tolerance with prolonged positioning   POC Expires: 22   Re-evaluation Date: 22   FOTO Scores/Date: Goal - 47; 22 -    Visit Count 1/10       Manuals        Bilateral ankle PROM all planes per tolerance KD                                       Ther Ex  Gastroc stretch 3x30" ea       Ankle DF/PF 10x ea       Ankle In/Ev 10x ea       Ankle Circles 10x ea CW/CCW                                                HEP Creation/instruction       Neuro Re-Ed                                                                                                                       Ther Act                                         Modalities

## 2022-06-13 NOTE — LETTER
2022    Maggy Seaman DPM  [address] [facility], [address] [place] [id number]    Patient: Ayana Morales   YOB: 1960   Date of Visit: 2022     Encounter Diagnosis     ICD-10-CM    1  Bilateral foot pain  M79 671     M79 672        Dear Dr Cano Counts: Thank you for your recent referral of Ayana Morales  Please review the attached evaluation summary from Ana María's recent visit  Please verify that you agree with the plan of care by signing the attached order  If you have any questions or concerns, please do not hesitate to call  I sincerely appreciate the opportunity to share in the care of one of your patients and hope to have another opportunity to work with you in the near future  Sincerely,    Rennie Hodgkins, PT      Referring Provider:      I certify that I have read the below Plan of Care and certify the need for these services furnished under this plan of treatment while under my care  Maggy Seaman DPM  [facility], [address] [place] [address], [place] [address]  Via Fax: [phone]          PT Evaluation     Today's date: 2022  Patient name: Ayana Morales  : 1960  MRN: [id number]  Referring provider: Christen Hodge DPM  Dx:   Encounter Diagnosis     ICD-10-CM    1  Bilateral foot pain  M79 671     M79 672        Start Time: 1415  Stop Time: 1500  Total time in clinic (min): 45 minutes    Assessment  Assessment details: Patient is a 65 y o  female who presents to physical therapy with c/o bilateral foot pain  Patient presents to evaluation with pain, decreased range of motion, decreased strength, decreased ambulatory status/quality of gait, and decreased tolerance to activity  Objective exam somewhat limited due to diffuse tenderness throughout bilateral feet and pain/guarding   Patient demonstrates fair tolerance to treatment and was provided with a written copy of their initial home exercise program focusing on ankle ROM and was encouraged to perform daily per tolerance  I discussed risks, benefits, and alternatives to treatment, and answered all patient questions to patient satisfaction  Patient presents with baseline FOTO score of 25 indicating limited tolerance/ability to complete ADLs  Patient is an appropriate candidate for skilled PT and would benefit from skilled PT services to address the aforementioned impairments, achieve goals, maximize function, and improve quality of life  Pt is in agreement with this plan      Patient Education: activity modifications as needed, pacing of activities, importance of HEP compliance, PT prognosis/POC   Impairments: abnormal gait, abnormal or restricted ROM, activity intolerance, impaired balance, impaired physical strength, lacks appropriate home exercise program, pain with function and weight-bearing intolerance    Goals  ST weeks  Pt will demonstrate good understanding and compliance with HEP  Pt will improve bilateral ankle dorsiflexion to 10 degrees to allow proper toe clearance during mid-swing phase of gait and improve safety with ambulation  Pt will demonstrate pain free ankle AROM WNL all planes   Pt will decrease pain to 5/10      LT weeks  Pt will improve FOTO score to > or = to 47 to indicate improved functional abilities   Pt will be able to perform 10 repetitions with single leg heel raises to indicate improved functional ankle strength/endurance to improve tolerance for ADLs   Pt will be able to perform SLS on non-compliant surfaces for 20" to demonstrates improved safety with balance   Pt will decrease pain to 2-3/10       Plan  Patient would benefit from: PT eval and skilled physical therapy  Planned modality interventions: cryotherapy and thermotherapy: hydrocollator packs  Planned therapy interventions: ADL training, manual therapy, neuromuscular re-education, patient education, self care, strengthening, stretching, therapeutic activities, therapeutic exercise, home exercise program, graded exercise, graded activity, gait training, functional ROM exercises, flexibility, body mechanics training and balance  Frequency: 2x week  Duration in weeks: 8  Plan of Care beginning date: 2022  Plan of Care expiration date: 2022  Treatment plan discussed with: patient        Subjective Evaluation    History of Present Illness  Mechanism of injury: Pt reports to PT with c/o bilateral foot pain that started around the end of February and progressively worsened with time without antecedent trauma  Pt had been treated on site for her lower back but ankle pain became so bad to where she couldn't progress exercises and ultimately decided to discharge lower back as it was doing better and switch to treatment of her feet  Pt was seen by Dr Ruthie Cervantes who referred her to PT as she was told she has tendinopathy  Pt ambulates unassisted  Pt does report N/T into her feet that is intermittent  Pt does report intermittent swelling  Pt does report ankle weakness/instability and has had several close calls for falling but was able to grab on to something to avoid falling  Aggravating factors: all WB activities, certain ankle movements    Easing Factors:  Rest, ice, diclofenac gel, elevation    PLOF:  Hx of bilateral foot pain since 2022    PMH: Asthma, chronic low back pain, poor tolerance with prolonged positioning  Pain  Current pain rating: 10  At best pain ratin  At worst pain rating: 10  Quality: sharp, dull ache and throbbing    Patient Goals  Patient goals for therapy: decreased edema, decreased pain, improved balance, increased motion, increased strength, independence with ADLs/IADLs and return to sport/leisure activities          Objective     General Comments:       Ankle/Foot Comments   Posture in WB reveals bilateral pronation (R>L) with slight ER of right leg, bilateral calcaneal valgus     Left ankle AROM: DF -10* PF 35* Inv 13* Ev 4* (pain limiting all planes)  Right ankle AROM: DF -10* PF 30* Inv 4* Ev 2* (pain limiting all planes)    Ankle MMT deferred 2* to pain and limited AROM all planes    Gait Assessment: Pt ambulates unassisted with antalgic gait, short stride length, fwd trunk lean, often reaching for items to hold onto while walking (wall, plinth table, etc)    Inversion Test: (+)  Ant drawer limited 2* to pain and guarding bilaterally  Parish-Fuentes Test: (-)  Syndesmotic squeeze (-)    Palpation: Left: Exquisite TTP along medial and lateral malleoli, ATFL, navicular, calf, plantar aspect of foot diffusely; Right: exquisite TTP along medial/lateral malleoli, ATFL, CFL, PTFL, navicular, calf, and plantar aspect of foot diffusely    FOTO: 25               Diagnosis: Bilateral foot pain   Precautions: Asthma, chronic low back pain, poor tolerance with prolonged positioning   POC Expires: 8/8/22   Re-evaluation Date: 7/11/22   FOTO Scores/Date: Goal - 47; 6/13/22 - 25   Visit Count 1/10       Manuals 6/13       Bilateral ankle PROM all planes per tolerance KD                                       Ther Ex 6/13       Gastroc stretch 3x30" ea       Ankle DF/PF 10x ea       Ankle In/Ev 10x ea       Ankle Circles 10x ea CW/CCW                                                HEP Creation/instruction       Neuro Re-Ed                                                                                                                       Ther Act                                         Modalities

## 2022-06-16 ENCOUNTER — OFFICE VISIT (OUTPATIENT)
Dept: PHYSICAL THERAPY | Facility: CLINIC | Age: 62
End: 2022-06-16
Payer: COMMERCIAL

## 2022-06-16 DIAGNOSIS — G89.29 CHRONIC BILATERAL LOW BACK PAIN WITH BILATERAL SCIATICA: ICD-10-CM

## 2022-06-16 DIAGNOSIS — M79.672 BILATERAL FOOT PAIN: Primary | ICD-10-CM

## 2022-06-16 DIAGNOSIS — M54.42 CHRONIC BILATERAL LOW BACK PAIN WITH BILATERAL SCIATICA: ICD-10-CM

## 2022-06-16 DIAGNOSIS — M79.671 BILATERAL FOOT PAIN: Primary | ICD-10-CM

## 2022-06-16 DIAGNOSIS — M54.41 CHRONIC BILATERAL LOW BACK PAIN WITH BILATERAL SCIATICA: ICD-10-CM

## 2022-06-16 PROCEDURE — 97140 MANUAL THERAPY 1/> REGIONS: CPT

## 2022-06-16 PROCEDURE — 97110 THERAPEUTIC EXERCISES: CPT

## 2022-06-16 NOTE — PROGRESS NOTES
Daily Note     Today's date: 2022  Patient name: Britton Sharp  : 1960  MRN: [id number]  Referring provider: Sonny Jimenez DPM  Dx:   Encounter Diagnosis     ICD-10-CM    1  Bilateral foot pain  M79 671     M79 672    2  Chronic bilateral low back pain with bilateral sciatica  M54 42     M54 41     G89 29        Start Time: 1021  Stop Time: 1053  Total time in clinic (min): 32 minutes    Subjective:  Patient reports increased BL foot pain  Reports increased time spent on feet yesterday doing shopping  Objective: See treatment diary below      Assessment:  Patient was able to complete therapy without reported increase to baseline soreness  Reports point tenderness to plantar surface with palplation during manual interventions  Was able to perform AROM against slight resistance with cueing on knee compensations and working with in tolerance      Plan: Continue per plan of care  Progress treatment as tolerated         Diagnosis: Bilateral foot pain   Precautions: Asthma, chronic low back pain, poor tolerance with prolonged positioning   POC Expires: 22   Re-evaluation Date: 22   FOTO Scores/Date: Goal - 47; 22 -    Visit Count 1/10 2/10      Manuals       Bilateral ankle PROM all planes per tolerance KD ESTEPHANIA                                      Ther Ex       Gastroc stretch 3x30" ea 30"x3 ea      Ankle DF/PF 10x ea 20x ea      Ankle In/Ev 10x ea 20x ea      Ankle Circles 10x ea CW/CCW  20x ea      T-band Ankle 4 way  YTB x10 ea BL                                      HEP Creation/instruction       Neuro Re-Ed                                                                                                                       Ther Act                                         Modalities

## 2022-06-21 ENCOUNTER — OFFICE VISIT (OUTPATIENT)
Dept: PHYSICAL THERAPY | Facility: CLINIC | Age: 62
End: 2022-06-21
Payer: COMMERCIAL

## 2022-06-21 DIAGNOSIS — M54.41 CHRONIC BILATERAL LOW BACK PAIN WITH BILATERAL SCIATICA: ICD-10-CM

## 2022-06-21 DIAGNOSIS — M79.672 BILATERAL FOOT PAIN: Primary | ICD-10-CM

## 2022-06-21 DIAGNOSIS — M79.671 BILATERAL FOOT PAIN: Primary | ICD-10-CM

## 2022-06-21 DIAGNOSIS — M54.42 CHRONIC BILATERAL LOW BACK PAIN WITH BILATERAL SCIATICA: ICD-10-CM

## 2022-06-21 DIAGNOSIS — G89.29 CHRONIC BILATERAL LOW BACK PAIN WITH BILATERAL SCIATICA: ICD-10-CM

## 2022-06-21 PROCEDURE — 97140 MANUAL THERAPY 1/> REGIONS: CPT

## 2022-06-21 PROCEDURE — 97110 THERAPEUTIC EXERCISES: CPT

## 2022-06-21 NOTE — PROGRESS NOTES
Daily Note     Today's date: 2022  Patient name: Kenisha Borjas  : 1960  MRN: [id number]  Referring provider: Solomon Valentine DPM  Dx:   Encounter Diagnosis     ICD-10-CM    1  Bilateral foot pain  M79 671     M79 672    2  Chronic bilateral low back pain with bilateral sciatica  M54 42     M54 41     G89 29        Start Time: 1016  Stop Time: 1052  Total time in clinic (min): 36 minutes    Subjective: patient reports increased soreness and pain post last therapy session that has improved since  Reports overall improvement stating limping less with ambulation      Objective: See treatment diary below      Assessment: patient was able to complete therapy with in tolerance  Held further progression due to amount of reported pain post last therapy session  Patient reports point tenderness to BL plantar surface  Patient was challenged with R >L with ankle 4 way  Plan: Continue per plan of care  Progress treatment as tolerated         Diagnosis: Bilateral foot pain   Precautions: Asthma, chronic low back pain, poor tolerance with prolonged positioning   POC Expires: 22   Re-evaluation Date: 22   FOTO Scores/Date: Goal - 47; 22 -    Visit Count 1/10 2/10 3/10     Manuals      Bilateral ankle PROM all planes per tolerance KD ESTEPHANIA ESTEPHANIA                                     Ther Ex      Gastroc stretch 3x30" ea 30"x3 ea 30"x3     Ankle DF/PF 10x ea 20x ea 20x ea     Ankle In/Ev 10x ea 20x ea 20x ea     Ankle Circles 10x ea CW/CCW  20x ea 20x ea      T-band Ankle 4 way  YTB x10 ea BL YTB x 20 ea BL                                     HEP Creation/instruction       Neuro Re-Ed                                                                                                                       Ther Act                                         Modalities

## 2022-06-23 ENCOUNTER — OFFICE VISIT (OUTPATIENT)
Dept: PHYSICAL THERAPY | Facility: CLINIC | Age: 62
End: 2022-06-23
Payer: COMMERCIAL

## 2022-06-23 DIAGNOSIS — M79.672 BILATERAL FOOT PAIN: ICD-10-CM

## 2022-06-23 DIAGNOSIS — M54.41 CHRONIC BILATERAL LOW BACK PAIN WITH BILATERAL SCIATICA: Primary | ICD-10-CM

## 2022-06-23 DIAGNOSIS — M79.671 BILATERAL FOOT PAIN: ICD-10-CM

## 2022-06-23 DIAGNOSIS — M54.42 CHRONIC BILATERAL LOW BACK PAIN WITH BILATERAL SCIATICA: Primary | ICD-10-CM

## 2022-06-23 DIAGNOSIS — G89.29 CHRONIC BILATERAL LOW BACK PAIN WITH BILATERAL SCIATICA: Primary | ICD-10-CM

## 2022-06-23 PROCEDURE — 97110 THERAPEUTIC EXERCISES: CPT

## 2022-06-23 PROCEDURE — 97140 MANUAL THERAPY 1/> REGIONS: CPT

## 2022-06-23 NOTE — PROGRESS NOTES
Daily Note     Today's date: 2022  Patient name: Zenaida Balderas  : 1960  MRN: [id number]  Referring provider: Abisai Paniagua DPM  Dx:   Encounter Diagnosis     ICD-10-CM    1  Chronic bilateral low back pain with bilateral sciatica  M54 42     M54 41     G89 29    2  Bilateral foot pain  M79 671     M79 672        Start Time: 1016  Stop Time: 1057  Total time in clinic (min): 41 minutes    Subjective: patient reports soreness post last therapy session  Reports increased coming into therapy stating weather related      Objective: See treatment diary below      Assessment: patient was able to complete therapy with in tolerance  Held further progression due to reports  Point tenderness along BL plantar fascia reported with manual interventions    Plan: Continue per plan of care  Progress treatment as tolerated         Diagnosis: Bilateral foot pain   Precautions: Asthma, chronic low back pain, poor tolerance with prolonged positioning   POC Expires: 22   Re-evaluation Date: 22   FOTO Scores/Date: Goal - 47; 22 -    Visit Count /10 2/10 3/10     Manuals      Bilateral ankle PROM all planes per tolerance KD ESTEPHANIA ESTEPHANIA                                     Ther Ex      Gastroc stretch 3x30" ea 30"x3 ea 30"x3     Ankle DF/PF 10x ea 20x ea 20x ea     Ankle In/Ev 10x ea 20x ea 20x ea     Ankle Circles 10x ea CW/CCW  20x ea 20x ea      T-band Ankle 4 way  YTB x10 ea BL YTB x 20 ea BL     Plantar Fascia Stretch   30"x3 ea     Toe Scrutches   NV                     HEP Creation/instruction       Neuro Re-Ed                                                                                                                       Ther Act                                         Modalities

## 2022-06-28 ENCOUNTER — OFFICE VISIT (OUTPATIENT)
Dept: PHYSICAL THERAPY | Facility: CLINIC | Age: 62
End: 2022-06-28
Payer: COMMERCIAL

## 2022-06-28 DIAGNOSIS — M54.42 CHRONIC BILATERAL LOW BACK PAIN WITH BILATERAL SCIATICA: Primary | ICD-10-CM

## 2022-06-28 DIAGNOSIS — G89.29 CHRONIC BILATERAL LOW BACK PAIN WITH BILATERAL SCIATICA: Primary | ICD-10-CM

## 2022-06-28 DIAGNOSIS — M79.671 BILATERAL FOOT PAIN: ICD-10-CM

## 2022-06-28 DIAGNOSIS — M54.41 CHRONIC BILATERAL LOW BACK PAIN WITH BILATERAL SCIATICA: Primary | ICD-10-CM

## 2022-06-28 DIAGNOSIS — M79.672 BILATERAL FOOT PAIN: ICD-10-CM

## 2022-06-28 PROCEDURE — 97140 MANUAL THERAPY 1/> REGIONS: CPT

## 2022-06-28 PROCEDURE — 97110 THERAPEUTIC EXERCISES: CPT

## 2022-06-28 NOTE — PROGRESS NOTES
Daily Note     Today's date: 2022  Patient name: Yusuf Cline  : 1960  MRN: [id number]  Referring provider: Yusuf Saleh DPM  Dx:   Encounter Diagnosis     ICD-10-CM    1  Chronic bilateral low back pain with bilateral sciatica  M54 42     M54 41     G89 29    2  Bilateral foot pain  M79 671     M79 672        Start Time: 1017  Stop Time: 1103  Total time in clinic (min): 46 minutes    Subjective: patient reports continued BL foot pain  Reports good and bad days  Reports with weight bearing  States hitting L foot off of table earlier in day at home  Objective: See treatment diary below      Assessment:  Patient was able to complete therapy with in tolerance  Was able to utilize recumbent bike as initial warm up  Continued point tenderness with palpation  Was able to introduce towel scrunches for intrinsic foot muscularity strengthening      Plan: Continue per plan of care  Progress treatment as tolerated         Diagnosis: Bilateral foot pain   Precautions: Asthma, chronic low back pain, poor tolerance with prolonged positioning   POC Expires: 22   Re-evaluation Date: 22   FOTO Scores/Date: Goal - 52; 22 -    Visit Count 1/10 2/10 3/10 4/10    Manuals     Bilateral ankle PROM all planes per tolerance KD ESTEPHANIA ESTEPHANIA ESTEPHANIA                                    Ther Ex     Gastroc stretch 3x30" ea 30"x3 ea 30"x3 30"x3    Ankle DF/PF 10x ea 20x ea 20x ea     Ankle In/Ev 10x ea 20x ea 20x ea     Ankle Circles 10x ea CW/CCW  20x ea 20x ea      T-band Ankle 4 way  YTB x10 ea BL YTB x 20 ea BL YTB x 20 BL ea    Plantar Fascia Stretch   30"x3 ea 30"x3 ea    Toe Scrutches   NV 2 mins BL     Bike     L1 x 6 mins            HEP Creation/instruction       Neuro Re-Ed                                                                                                                       Ther Act                                         Modalities

## 2022-06-30 ENCOUNTER — OFFICE VISIT (OUTPATIENT)
Dept: PHYSICAL THERAPY | Facility: CLINIC | Age: 62
End: 2022-06-30
Payer: COMMERCIAL

## 2022-06-30 DIAGNOSIS — M79.672 BILATERAL FOOT PAIN: ICD-10-CM

## 2022-06-30 DIAGNOSIS — M54.41 CHRONIC BILATERAL LOW BACK PAIN WITH BILATERAL SCIATICA: Primary | ICD-10-CM

## 2022-06-30 DIAGNOSIS — M79.671 BILATERAL FOOT PAIN: ICD-10-CM

## 2022-06-30 DIAGNOSIS — M54.42 CHRONIC BILATERAL LOW BACK PAIN WITH BILATERAL SCIATICA: Primary | ICD-10-CM

## 2022-06-30 DIAGNOSIS — G89.29 CHRONIC BILATERAL LOW BACK PAIN WITH BILATERAL SCIATICA: Primary | ICD-10-CM

## 2022-06-30 PROCEDURE — 97140 MANUAL THERAPY 1/> REGIONS: CPT

## 2022-06-30 PROCEDURE — 97110 THERAPEUTIC EXERCISES: CPT

## 2022-06-30 NOTE — PROGRESS NOTES
Daily Note     Today's date: 2022  Patient name: Carlos Manuel Hawley  : 1960  MRN: [id number]  Referring provider: Chris Dawn DPM  Dx:   Encounter Diagnosis     ICD-10-CM    1  Chronic bilateral low back pain with bilateral sciatica  M54 42     M54 41     G89 29    2  Bilateral foot pain  M79 671     M79 672        Start Time: 1016  Stop Time: 1042  Total time in clinic (min): 26 minutes    Subjective: patient reports increased BL foot pain and lower back pain  Reports for past 2 days  Denies any increased activity or incident  Reports small overall improvement  Reports being able to be on feet for little longer period of time  Reports R ankle swelling      Objective: See treatment diary below      Assessment:  Patient was limited in therapy due to increased BL foot and LB pain  Patient presents with R ankle swelling  Performed gentle ROM holding ankle strengthening  Plan: Continue per plan of care  Progress treatment as tolerated  Diagnosis: Bilateral foot pain   Precautions: Asthma, chronic low back pain, poor tolerance with prolonged positioning   POC Expires: 22   Re-evaluation Date: 22   FOTO Scores/Date: Goal - 47; 22 -    Visit Count 1/10 2/10 310 4/10 510   Manuals      Bilateral ankle PROM all planes per tolerance KD ESTEPHANIA ESTEPHANIA ESTEPHANIA ESTEPHANIA                                   Ther Ex    Gastroc stretch 3x30" ea 30"x3 ea 30"x3 30"x3 30"x3   Ankle DF/PF 10x ea 20x ea 20x ea  20x ea   Ankle In/Ev 10x ea 20x ea 20x ea  20x ea   Ankle Circles 10x ea CW/CCW  20x ea 20x ea   20x ea   T-band Ankle 4 way  YTB x10 ea BL YTB x 20 ea BL YTB x 20 BL ea    Plantar Fascia Stretch   30"x3 ea 30"x3 ea 30"x3   Toe Scrutches   NV 2 mins BL     Bike     L1 x 6 mins            HEP Creation/instruction       Neuro Re-Ed                                                                                                                       Ther Act                                         Modalities

## 2022-07-05 ENCOUNTER — OFFICE VISIT (OUTPATIENT)
Dept: PHYSICAL THERAPY | Facility: CLINIC | Age: 62
End: 2022-07-05
Payer: COMMERCIAL

## 2022-07-05 DIAGNOSIS — M54.41 CHRONIC BILATERAL LOW BACK PAIN WITH BILATERAL SCIATICA: ICD-10-CM

## 2022-07-05 DIAGNOSIS — M54.42 CHRONIC BILATERAL LOW BACK PAIN WITH BILATERAL SCIATICA: ICD-10-CM

## 2022-07-05 DIAGNOSIS — G89.29 CHRONIC BILATERAL LOW BACK PAIN WITH BILATERAL SCIATICA: ICD-10-CM

## 2022-07-05 DIAGNOSIS — M79.671 BILATERAL FOOT PAIN: Primary | ICD-10-CM

## 2022-07-05 DIAGNOSIS — M79.672 BILATERAL FOOT PAIN: Primary | ICD-10-CM

## 2022-07-05 PROCEDURE — 97110 THERAPEUTIC EXERCISES: CPT

## 2022-07-05 PROCEDURE — 97140 MANUAL THERAPY 1/> REGIONS: CPT

## 2022-07-05 NOTE — PROGRESS NOTES
Daily Note     Today's date: 2022  Patient name: Severo Medeiros  : 1960  MRN: [id number]  Referring provider: Roni Mclaughlin DPM  Dx:   Encounter Diagnosis     ICD-10-CM    1  Bilateral foot pain  M79 671     M79 672    2  Chronic bilateral low back pain with bilateral sciatica  M54 42     M54 41     G89 29        Start Time: 1015  Stop Time: 1100  Total time in clinic (min): 45 minutes    Subjective: Pt reports pain is elevated in B feet R>L, which she attributes to prolonged standing/amb previous day  She reports no medical updates since previous session  Objective: See treatment diary below      Assessment: pt tolerates treatment session fair  She is given cues on proper form for long sit ankle AROM  She reports more comfort with rolled towel underneath ankle for support  She reports no sig ncrease in sx during treatment session and no change in sx at the end of her session      Plan: Continue per plan of care  Progress treatment as tolerated         Diagnosis: Bilateral foot pain     Precautions: Asthma, chronic low back pain, poor tolerance with prolonged positioning     POC Expires: 22     Re-evaluation Date: 22     FOTO Scores/Date: Goal - 52; 22 -      Visit Count 6/10 2/10 3/10 4/10 5/10 6/10    Manuals   75    Bilateral ankle PROM all planes per tolerance  ESTEPHANIA ESTEPHANIA ESTEPHANIA ESTEPHANIA FH                                            Ther Ex  7/5    Gastroc stretch  30"x3 ea 30"x3 30"x3 30"x3 30''x3    Ankle DF/PF  20x ea 20x ea  20x ea 20x ea    Ankle In/Ev  20x ea 20x ea  20x ea 20x ea    Ankle Circles  20x ea 20x ea   20x ea 20x ea    T-band Ankle 4 way  YTB x10 ea BL YTB x 20 ea BL YTB x 20 BL ea  MREx 20 BL ea    Plantar Fascia Stretch   30"x3 ea 30"x3 ea 30"x3 30'x3    Toe Scrutches   NV 2 mins BL   2 min BL    Bike     L1 x 6 mins                HEP          Neuro Re-Ed Ther Act                                             Modalities

## 2022-07-07 ENCOUNTER — OFFICE VISIT (OUTPATIENT)
Dept: PHYSICAL THERAPY | Facility: CLINIC | Age: 62
End: 2022-07-07
Payer: COMMERCIAL

## 2022-07-07 DIAGNOSIS — M54.42 CHRONIC BILATERAL LOW BACK PAIN WITH BILATERAL SCIATICA: Primary | ICD-10-CM

## 2022-07-07 DIAGNOSIS — M79.672 BILATERAL FOOT PAIN: ICD-10-CM

## 2022-07-07 DIAGNOSIS — M54.41 CHRONIC BILATERAL LOW BACK PAIN WITH BILATERAL SCIATICA: Primary | ICD-10-CM

## 2022-07-07 DIAGNOSIS — G89.29 CHRONIC BILATERAL LOW BACK PAIN WITH BILATERAL SCIATICA: Primary | ICD-10-CM

## 2022-07-07 DIAGNOSIS — M79.671 BILATERAL FOOT PAIN: ICD-10-CM

## 2022-07-07 PROCEDURE — 97140 MANUAL THERAPY 1/> REGIONS: CPT

## 2022-07-07 PROCEDURE — 97110 THERAPEUTIC EXERCISES: CPT

## 2022-07-07 NOTE — PROGRESS NOTES
Daily Note     Today's date: 2022  Patient name: Severo Medeiros  : 1960  MRN: [id number]  Referring provider: Roni Mclaughlin DPM  Dx:   Encounter Diagnosis     ICD-10-CM    1  Chronic bilateral low back pain with bilateral sciatica  M54 42     M54 41     G89 29    2  Bilateral foot pain  M79 671     M79 672        Start Time: 1015  Stop Time: 1059  Total time in clinic (min): 44 minutes    Subjective: patient reports continued BL foot/ankle pain  Reports some relief today coming into therapy  Objective: See treatment diary below      Assessment:  Patient continues to be limited in therapy due to pain  Patient was most limited on R ankle eversion with manuals and AROM  Cueing on sequencing throughout  Plan: Continue per plan of care  Progress treatment as tolerated         Diagnosis: Bilateral foot pain    Precautions: Asthma, chronic low back pain, poor tolerance with prolonged positioning    POC Expires: 22    Re-evaluation Date: 22    FOTO Scores/Date:  - ; 22 -     Visit Count 7/10 2/10 3/10 4/10 5/10 6/10   Manuals   7   Bilateral ankle PROM all planes per tolerance ESTEPHANIA ESTEPHANIA ESTEPHANIA ESTEPHANIA ESTEPHANIA FH                                       Ther Ex  7   Gastroc stretch 30"x3 30"x3 ea 30"x3 30"x3 30"x3 30''x3   Ankle DF/PF 30"x3 20x ea 20x ea  20x ea 20x ea   Ankle In/Ev 20x 20x ea 20x ea  20x ea 20x ea   Ankle Circles 20x 20x ea 20x ea   20x ea 20x ea   T-band Ankle 4 way YTB x20 BL ea YTB x10 ea BL YTB x 20 ea BL YTB x 20 BL ea  MREx 20 BL ea   Plantar Fascia Stretch 30"x3   30"x3 ea 30"x3 ea 30"x3 30'x3   Toe Scrutches   NV 2 mins BL   2 min BL   Bike     L1 x 6 mins              HEP         Neuro Re-Ed                                                                                                                                    Ther Act                                         Modalities

## 2022-07-12 ENCOUNTER — EVALUATION (OUTPATIENT)
Dept: PHYSICAL THERAPY | Facility: CLINIC | Age: 62
End: 2022-07-12
Payer: COMMERCIAL

## 2022-07-12 DIAGNOSIS — M79.672 BILATERAL FOOT PAIN: Primary | ICD-10-CM

## 2022-07-12 DIAGNOSIS — M79.671 BILATERAL FOOT PAIN: Primary | ICD-10-CM

## 2022-07-12 PROCEDURE — 97110 THERAPEUTIC EXERCISES: CPT | Performed by: PHYSICAL THERAPIST

## 2022-07-12 NOTE — PROGRESS NOTES
PT Discharge    Today's date: 2022  Patient name: Celestina Mcintosh  : 1960  MRN: [id number]  Referring provider: Maria A Mejia DPM  Dx:   Encounter Diagnosis     ICD-10-CM    1  Bilateral foot pain  M79 671     M79 672        Start Time:   Stop Time: 0768  Total time in clinic (min): 25 minutes    Assessment  Assessment details: Pt has been seen for 9 visits and has made excellent subjective/objective improvements since enrolling in therapy with improved FOTO score from 25 to 49 since initial evaluation  Pt exhibits improving ankle ROM/strength but continues to have pain and has hit a plateau in therapy with inability to progress exercises to standing due to pain level  Discussed with patient and decided on discharge to [facility] to continue to work on ankle ROM/strength until next MD follow up in September with podiatrist for re-assessment  Pt encouraged to continue with HEP on regular basis per tolerance and was educated on how to progress/regress exercises per symptoms/tolerance  Pt is in agreement with plan        Goals  ST weeks  Pt will demonstrate good understanding and compliance with HEP MET  Pt will improve bilateral ankle dorsiflexion to 10 degrees to allow proper toe clearance during mid-swing phase of gait and improve safety with ambulation NOT MET  Pt will demonstrate pain free ankle AROM WNL all planes NOT MET  Pt will decrease pain to 5/10  NOT MET    LT weeks  Pt will improve FOTO score to > or = to 47 to indicate improved functional abilities MET  Pt will be able to perform 10 repetitions with single leg heel raises to indicate improved functional ankle strength/endurance to improve tolerance for ADLs NOT MET  Pt will be able to perform SLS on non-compliant surfaces for 20" to demonstrates improved safety with balance NOT MET  Pt will decrease pain to 2-3/10 NOT MET      Plan  Plan details: Discharge to [facility]  Patient would benefit from: skilled physical therapy  Planned modality interventions: cryotherapy and thermotherapy: hydrocollator packs  Planned therapy interventions: ADL training, manual therapy, neuromuscular re-education, patient education, self care, strengthening, stretching, therapeutic activities, therapeutic exercise, home exercise program, graded exercise, graded activity, gait training, functional ROM exercises, flexibility, body mechanics training and balance  Plan of Care beginning date: 2022  Plan of Care expiration date: 2022  Treatment plan discussed with: patient        Subjective Evaluation    History of Present Illness  Mechanism of injury: Pt states her feet are doing somewhat better, still has good days and bad days  Pt feels she has plateau in PT at this point  Pt reports good compliance with her home exercises  Pt still has limited ability to tolerate weight bearing activities  Pt reports about 40% improvement since enrolling in therapy  Pain  Current pain ratin  At best pain ratin  At worst pain rating: 10  Quality: sharp, dull ache and throbbing          Objective     General Comments:       Ankle/Foot Comments   Posture in WB reveals bilateral pronation (R>L) with slight ER of right leg, bilateral calcaneal valgus     Left ankle AROM: DF 5* PF 43* (pain) Inv 15* (pain) Ev 5*   Right ankle AROM: DF 0* PF 40* (pain) Inv 5* (pain) Ev 2*     Ankle MMT 5-/5 throughout on left; 5-/5 DF and Eversion, 4/5 Inversion and PF on right    Gait Assessment: Pt ambulates unassisted with antalgic gait, short stride length, fwd trunk lean     FOTO: 49 (improved from 25 at IE)        Flowsheet Rows    Flowsheet Row Most Recent Value   PT/OT G-Codes    Current Score 49   Projected Score 47                Diagnosis: Bilateral foot pain    Precautions: Asthma, chronic low back pain, poor tolerance with prolonged positioning    POC Expires: 22    Re-evaluation Date: 22    FOTO Scores/Date: Goal - 47; 22 - 25;  - 49    Visit Count 7/10 2/10 3/10 4/10 5/10 6/10   Manuals 7/7 7/12 6/21 6/28 6/30 7/5   Bilateral ankle PROM all planes per tolerance ESTEPHANIA  ESTEPHANIA ESTEPHANIA ESTEPHANIA FH                                       Ther Ex 7/7 7/12 6/21 6/28 6/30 7/5   Gastroc stretch 30"x3  30"x3 30"x3 30"x3 30''x3   Ankle DF/PF 30"x3  20x ea  20x ea 20x ea   Ankle In/Ev 20x  20x ea  20x ea 20x ea   Ankle Circles 20x  20x ea   20x ea 20x ea   T-band Ankle 4 way YTB x20 BL ea  YTB x 20 ea BL YTB x 20 BL ea  MREx 20 BL ea   Plantar Fascia Stretch 30"x3   30"x3 ea 30"x3 ea 30"x3 30'x3   Toe Scrutches   NV 2 mins BL   2 min BL   Bike     L1 x 6 mins              HEP  Re-assessed bilateral feet and FOTO; HEP creation and instruction, educated on continued HEP compliance        Neuro Re-Ed                                                                                                                                    Ther Act                                         Modalities

## 2022-07-14 ENCOUNTER — APPOINTMENT (OUTPATIENT)
Dept: PHYSICAL THERAPY | Facility: CLINIC | Age: 62
End: 2022-07-14
Payer: COMMERCIAL

## 2024-05-24 NOTE — LETTER
2021    Bobby Harmon MD  [facility] [address] [facility] [address]    Patient: Angy Sanchez   YOB: 1960   Date of Visit: 2021     Encounter Diagnosis     ICD-10-CM    1  Cervicogenic headache  R51 9 Ambulatory referral to Physical Therapy   2  Occipital neuralgia of right side  M54 81 Ambulatory referral to Physical Therapy   3  Benign paroxysmal positional vertigo, left  H81 12 Ambulatory referral to Physical Therapy   4  Paresthesia  R20 2 Ambulatory referral to Physical Therapy   5  Sensory ataxia  R27 8 Ambulatory referral to Physical Therapy   6  Right cervical radiculopathy  M54 12 Ambulatory referral to Physical Therapy       Dear Dr Marlon Brooks: Thank you for your recent referral of Angy Sanchez  Please review the attached evaluation summary from Ana María's recent visit  Please verify that you agree with the plan of care by signing the attached order  If you have any questions or concerns, please do not hesitate to call  I sincerely appreciate the opportunity to share in the care of one of your patients and hope to have another opportunity to work with you in the near future  Sincerely,    Shmuel Guajardo, PT      Referring Provider:      I certify that I have read the below Plan of Care and certify the need for these services furnished under this plan of treatment while under my care  Bobby Harmon MD  [facility] [address] [facility] [address]  [place]          PT Evaluation     Today's date: 2021  Patient name: Angy Sanchez  : 1960  MRN: [id number]  Referring provider: Valentino Silos, MD  Dx:   Encounter Diagnosis     ICD-10-CM    1  Cervicogenic headache  R51 9 Ambulatory referral to Physical Therapy   2  Occipital neuralgia of right side  M54 81 Ambulatory referral to Physical Therapy   3  Benign paroxysmal positional vertigo, left  H81 12 Ambulatory referral to Physical Therapy   4   Peripheral Her GT was not deflating properly preventing it from being exchanged easily. We were able to remove and replace GT today. Recommend next GT change in 3 months.    neuropathy  G62 9 Ambulatory referral to Physical Therapy   5  Paresthesia  R20 2 Ambulatory referral to Physical Therapy   6  Sensory ataxia  R27 8 Ambulatory referral to Physical Therapy   7  Right cervical radiculopathy  M54 12 Ambulatory referral to Physical Therapy       Start Time: 0930  Stop Time: 1015  Total time in clinic (min): 45 minutes    Assessment  Assessment details: Patient presents with chronic neck pain, recent neuropathy, demonstrates FHRS, (+) BL UT myofascial tightness, decreased Rom and Strength, reports difficulty turning head, reaching overhead, sitting for prolonged periods; patient would benefit from therapeutic exercise, manual therapy, patient education regarding posture and joint protection, therapeutic activities; patient issued HEP  Impairments: abnormal or restricted ROM, impaired physical strength and pain with function  Understanding of Dx/Px/POC: good   Prognosis: good    Goals  STGs  1  Decrease pain 50% in 2 weeks  2  Increase Rom 50% in 2 weeks  3  Increase Strength half grade in 2 weeks  4  Compliant with HEP in 2 weeks  LTGs  1  Decrease pain to mild to none in 4 weeks  2  Increase Rom WNL in 4 weeks  3  Increase Strength WNL in 4 weeks  4  Able to turn head with mild to no difficulty in 4 weeks  5  Able to reach overhead with mild to no difficulty in 4 weeks  6   Able to sit for prolonged periods of time with mild to no difficulty in 4 weeks    Plan  Patient would benefit from: skilled physical therapy  Planned modality interventions: hydrotherapy  Planned therapy interventions: manual therapy, neuromuscular re-education, patient education, postural training, therapeutic exercise, therapeutic activities, home exercise program and functional ROM exercises  Frequency: 2x week  Duration in weeks: 4        Subjective Evaluation    History of Present Illness  Mechanism of injury: Chronic, neuropathy recent          Recurrent probem    Quality of life: good    Pain  Current pain ratin  At best pain ratin  At worst pain rating: 10  Quality: radiating, dull ache and sharp  Relieving factors: relaxation and medications  Aggravating factors: overhead activity and sitting    Social Support  Lives with: adult children    Employment status: not working  Hand dominance: right      Diagnostic Tests  MRI studies: abnormal  Treatments  Current treatment: medication  Patient Goals  Patient goals for therapy: increased motion, decreased pain and increased strength  Patient goal: turn head, reach overhead, sit for prolonged periods of time        Objective     Postural Observations    Additional Postural Observation Details  FHRS    Palpation   Left   Hypertonic in the upper trapezius  Right   Hypertonic in the upper trapezius       Active Range of Motion   Cervical/Thoracic Spine       Cervical    Flexion:  with pain Restriction level: moderate  Extension:  with pain Restriction level: moderate  Left rotation:  Restriction level: moderate  Right rotation:  Restriction level: moderate  Left Shoulder   Flexion: 100 degrees   Extension: 20 degrees   Abduction: 80 degrees   External rotation 0°: 40 degrees   Internal rotation BTB: T12     Right Shoulder   Flexion: 100 degrees   Extension: 20 degrees   Abduction: 80 degrees   External rotation 0°: 40 degrees   Internal rotation BTB: T12     Strength/Myotome Testing     Left Shoulder     Planes of Motion   Flexion: 3+   Extension: 3+   Abduction: 3+   External rotation at 0°: 3+   Internal rotation at 0°: 4-     Right Shoulder     Planes of Motion   Flexion: 3+   Extension: 3+   Abduction: 3+   External rotation at 0°: 3+   Internal rotation at 0°: 4-              Precautions: Chiari malformation      Manual             MFR             vib mass                                       Neuro Re-Ed             scap retract             kira 3-way                                                                              Ther Ex pulleys             Prom (gentle)             grippers             HEP 15                                                                Ther Activity             reach                          Gait Training                                       Modalities

## (undated) DEVICE — POOLE SUCTION HANDLE: Brand: CARDINAL HEALTH

## (undated) DEVICE — GLOVE INDICATOR PI UNDERGLOVE SZ 7.5 BLUE

## (undated) DEVICE — 3000CC GUARDIAN II: Brand: GUARDIAN

## (undated) DEVICE — TUBING SUCTION 5MM X 12 FT

## (undated) DEVICE — LIGHT HANDLE COVER SLEEVE DISP BLUE STELLAR

## (undated) DEVICE — ABDOMINAL PAD: Brand: DERMACEA

## (undated) DEVICE — KERLIX BANDAGE ROLL: Brand: KERLIX

## (undated) DEVICE — BETHLEHEM UNIVERSAL MINOR GEN: Brand: CARDINAL HEALTH

## (undated) DEVICE — GLOVE SRG BIOGEL ECLIPSE 7.5

## (undated) DEVICE — COVER ROLL 8 IN X 2 YD STRETCH TAPE

## (undated) DEVICE — INTENDED FOR TISSUE SEPARATION, AND OTHER PROCEDURES THAT REQUIRE A SHARP SURGICAL BLADE TO PUNCTURE OR CUT.: Brand: BARD-PARKER SAFETY BLADES SIZE 15, STERILE

## (undated) DEVICE — DRAPE EQUIPMENT RF WAND